# Patient Record
Sex: FEMALE | Race: ASIAN | NOT HISPANIC OR LATINO
[De-identification: names, ages, dates, MRNs, and addresses within clinical notes are randomized per-mention and may not be internally consistent; named-entity substitution may affect disease eponyms.]

---

## 2017-01-04 ENCOUNTER — APPOINTMENT (OUTPATIENT)
Dept: OBGYN | Facility: CLINIC | Age: 32
End: 2017-01-04

## 2017-01-04 VITALS
DIASTOLIC BLOOD PRESSURE: 60 MMHG | HEIGHT: 62 IN | SYSTOLIC BLOOD PRESSURE: 104 MMHG | WEIGHT: 168 LBS | BODY MASS INDEX: 30.91 KG/M2

## 2017-01-06 ENCOUNTER — OUTPATIENT (OUTPATIENT)
Dept: OUTPATIENT SERVICES | Facility: HOSPITAL | Age: 32
LOS: 1 days | End: 2017-01-06
Payer: COMMERCIAL

## 2017-01-06 DIAGNOSIS — O26.90 PREGNANCY RELATED CONDITIONS, UNSPECIFIED, UNSPECIFIED TRIMESTER: ICD-10-CM

## 2017-01-06 LAB
BILIRUB UR QL STRIP: NORMAL
GLUCOSE UR-MCNC: NORMAL
HCG UR QL: 0.2 EU/DL
HGB UR QL STRIP.AUTO: NORMAL
KETONES UR-MCNC: NORMAL
LEUKOCYTE ESTERASE UR QL STRIP: NORMAL
NITRITE UR QL STRIP: NORMAL
PH UR STRIP: 6
PROT UR STRIP-MCNC: NORMAL
SP GR UR STRIP: 1.03

## 2017-01-06 PROCEDURE — 99215 OFFICE O/P EST HI 40 MIN: CPT

## 2017-01-07 ENCOUNTER — OUTPATIENT (OUTPATIENT)
Dept: OUTPATIENT SERVICES | Facility: HOSPITAL | Age: 32
LOS: 1 days | End: 2017-01-07
Payer: COMMERCIAL

## 2017-01-07 DIAGNOSIS — O26.90 PREGNANCY RELATED CONDITIONS, UNSPECIFIED, UNSPECIFIED TRIMESTER: ICD-10-CM

## 2017-01-07 PROCEDURE — 99212 OFFICE O/P EST SF 10 MIN: CPT | Mod: 25

## 2017-01-07 PROCEDURE — 76816 OB US FOLLOW-UP PER FETUS: CPT | Mod: 26

## 2017-01-08 ENCOUNTER — INPATIENT (INPATIENT)
Facility: HOSPITAL | Age: 32
LOS: 1 days | Discharge: HOME | End: 2017-01-10
Attending: OBSTETRICS & GYNECOLOGY | Admitting: OBSTETRICS & GYNECOLOGY
Payer: COMMERCIAL

## 2017-01-08 ENCOUNTER — TRANSCRIPTION ENCOUNTER (OUTPATIENT)
Age: 32
End: 2017-01-08

## 2017-01-08 VITALS — HEART RATE: 91 BPM | SYSTOLIC BLOOD PRESSURE: 107 MMHG | DIASTOLIC BLOOD PRESSURE: 66 MMHG | TEMPERATURE: 98 F

## 2017-01-08 DIAGNOSIS — O26.90 PREGNANCY RELATED CONDITIONS, UNSPECIFIED, UNSPECIFIED TRIMESTER: ICD-10-CM

## 2017-01-08 LAB
BASOPHILS # BLD AUTO: 0.01 K/UL — SIGNIFICANT CHANGE UP (ref 0–0.2)
BASOPHILS NFR BLD AUTO: 0.1 % — SIGNIFICANT CHANGE UP (ref 0–2)
BLD GP AB SCN SERPL QL: NEGATIVE — SIGNIFICANT CHANGE UP
EOSINOPHIL # BLD AUTO: 0.05 K/UL — SIGNIFICANT CHANGE UP (ref 0–0.5)
EOSINOPHIL NFR BLD AUTO: 0.4 % — SIGNIFICANT CHANGE UP (ref 0–6)
HCT VFR BLD CALC: 34.7 % — SIGNIFICANT CHANGE UP (ref 34.5–45)
HGB BLD-MCNC: 10.8 G/DL — LOW (ref 11.5–15.5)
IMM GRANULOCYTES NFR BLD AUTO: 0.7 % — SIGNIFICANT CHANGE UP (ref 0–1.5)
LYMPHOCYTES # BLD AUTO: 1.45 K/UL — SIGNIFICANT CHANGE UP (ref 1–3.3)
LYMPHOCYTES # BLD AUTO: 10.6 % — LOW (ref 13–44)
MCHC RBC-ENTMCNC: 24.3 PG — LOW (ref 27–34)
MCHC RBC-ENTMCNC: 31.1 % — LOW (ref 32–36)
MCV RBC AUTO: 78 FL — LOW (ref 80–100)
MONOCYTES # BLD AUTO: 0.89 K/UL — SIGNIFICANT CHANGE UP (ref 0–0.9)
MONOCYTES NFR BLD AUTO: 6.5 % — SIGNIFICANT CHANGE UP (ref 2–14)
NEUTROPHILS # BLD AUTO: 11.17 K/UL — HIGH (ref 1.8–7.4)
NEUTROPHILS NFR BLD AUTO: 81.7 % — HIGH (ref 43–77)
PLATELET # BLD AUTO: 330 K/UL — SIGNIFICANT CHANGE UP (ref 150–400)
PMV BLD: 11.2 FL — SIGNIFICANT CHANGE UP (ref 7–13)
RBC # BLD: 4.45 M/UL — SIGNIFICANT CHANGE UP (ref 3.8–5.2)
RBC # FLD: 16.2 % — HIGH (ref 10.3–14.5)
RH IG SCN BLD-IMP: POSITIVE — SIGNIFICANT CHANGE UP
WBC # BLD: 13.67 K/UL — HIGH (ref 3.8–10.5)
WBC # FLD AUTO: 13.67 K/UL — HIGH (ref 3.8–10.5)

## 2017-01-08 PROCEDURE — 59400 OBSTETRICAL CARE: CPT | Mod: U8

## 2017-01-08 RX ORDER — LANOLIN
1 OINTMENT (GRAM) TOPICAL EVERY 6 HOURS
Qty: 0 | Refills: 0 | Status: DISCONTINUED | OUTPATIENT
Start: 2017-01-08 | End: 2017-01-10

## 2017-01-08 RX ORDER — OXYTOCIN 10 UNIT/ML
2 VIAL (ML) INJECTION
Qty: 30 | Refills: 0 | Status: DISCONTINUED | OUTPATIENT
Start: 2017-01-08 | End: 2017-01-08

## 2017-01-08 RX ORDER — HYDROCORTISONE 1 %
1 OINTMENT (GRAM) TOPICAL EVERY 4 HOURS
Qty: 0 | Refills: 0 | Status: DISCONTINUED | OUTPATIENT
Start: 2017-01-08 | End: 2017-01-08

## 2017-01-08 RX ORDER — OXYTOCIN 10 UNIT/ML
333.33 VIAL (ML) INJECTION
Qty: 20 | Refills: 0 | Status: COMPLETED | OUTPATIENT
Start: 2017-01-08

## 2017-01-08 RX ORDER — SODIUM CHLORIDE 9 MG/ML
3 INJECTION INTRAMUSCULAR; INTRAVENOUS; SUBCUTANEOUS EVERY 8 HOURS
Qty: 0 | Refills: 0 | Status: DISCONTINUED | OUTPATIENT
Start: 2017-01-08 | End: 2017-01-08

## 2017-01-08 RX ORDER — GLYCERIN ADULT
1 SUPPOSITORY, RECTAL RECTAL AT BEDTIME
Qty: 0 | Refills: 0 | Status: DISCONTINUED | OUTPATIENT
Start: 2017-01-08 | End: 2017-01-10

## 2017-01-08 RX ORDER — AER TRAVELER 0.5 G/1
1 SOLUTION RECTAL; TOPICAL EVERY 4 HOURS
Qty: 0 | Refills: 0 | Status: DISCONTINUED | OUTPATIENT
Start: 2017-01-08 | End: 2017-01-10

## 2017-01-08 RX ORDER — OXYCODONE HYDROCHLORIDE 5 MG/1
5 TABLET ORAL EVERY 4 HOURS
Qty: 0 | Refills: 0 | Status: DISCONTINUED | OUTPATIENT
Start: 2017-01-08 | End: 2017-01-10

## 2017-01-08 RX ORDER — AER TRAVELER 0.5 G/1
1 SOLUTION RECTAL; TOPICAL EVERY 4 HOURS
Qty: 0 | Refills: 0 | Status: DISCONTINUED | OUTPATIENT
Start: 2017-01-08 | End: 2017-01-08

## 2017-01-08 RX ORDER — DOCUSATE SODIUM 100 MG
100 CAPSULE ORAL
Qty: 0 | Refills: 0 | Status: DISCONTINUED | OUTPATIENT
Start: 2017-01-08 | End: 2017-01-10

## 2017-01-08 RX ORDER — OXYTOCIN 10 UNIT/ML
333.33 VIAL (ML) INJECTION
Qty: 20 | Refills: 0 | Status: COMPLETED | OUTPATIENT
Start: 2017-01-08 | End: 2017-01-08

## 2017-01-08 RX ORDER — SODIUM CHLORIDE 9 MG/ML
3 INJECTION INTRAMUSCULAR; INTRAVENOUS; SUBCUTANEOUS EVERY 8 HOURS
Qty: 0 | Refills: 0 | Status: DISCONTINUED | OUTPATIENT
Start: 2017-01-08 | End: 2017-01-09

## 2017-01-08 RX ORDER — HYDROCORTISONE 1 %
1 OINTMENT (GRAM) TOPICAL EVERY 4 HOURS
Qty: 0 | Refills: 0 | Status: DISCONTINUED | OUTPATIENT
Start: 2017-01-08 | End: 2017-01-10

## 2017-01-08 RX ORDER — CITRIC ACID/SODIUM CITRATE 300-500 MG
15 SOLUTION, ORAL ORAL EVERY 4 HOURS
Qty: 0 | Refills: 0 | Status: DISCONTINUED | OUTPATIENT
Start: 2017-01-08 | End: 2017-01-08

## 2017-01-08 RX ORDER — OXYTOCIN 10 UNIT/ML
41.67 VIAL (ML) INJECTION
Qty: 20 | Refills: 0 | Status: DISCONTINUED | OUTPATIENT
Start: 2017-01-08 | End: 2017-01-09

## 2017-01-08 RX ORDER — DIBUCAINE 1 %
1 OINTMENT (GRAM) RECTAL EVERY 4 HOURS
Qty: 0 | Refills: 0 | Status: DISCONTINUED | OUTPATIENT
Start: 2017-01-08 | End: 2017-01-08

## 2017-01-08 RX ORDER — KETOROLAC TROMETHAMINE 30 MG/ML
30 SYRINGE (ML) INJECTION ONCE
Qty: 0 | Refills: 0 | Status: DISCONTINUED | OUTPATIENT
Start: 2017-01-08 | End: 2017-01-08

## 2017-01-08 RX ORDER — PRAMOXINE HYDROCHLORIDE 150 MG/15G
1 AEROSOL, FOAM RECTAL EVERY 4 HOURS
Qty: 0 | Refills: 0 | Status: DISCONTINUED | OUTPATIENT
Start: 2017-01-08 | End: 2017-01-08

## 2017-01-08 RX ORDER — SODIUM CHLORIDE 9 MG/ML
1000 INJECTION, SOLUTION INTRAVENOUS ONCE
Qty: 0 | Refills: 0 | Status: COMPLETED | OUTPATIENT
Start: 2017-01-08 | End: 2017-01-08

## 2017-01-08 RX ORDER — ACETAMINOPHEN 500 MG
975 TABLET ORAL EVERY 6 HOURS
Qty: 0 | Refills: 0 | Status: DISCONTINUED | OUTPATIENT
Start: 2017-01-08 | End: 2017-01-10

## 2017-01-08 RX ORDER — PRAMOXINE HYDROCHLORIDE 150 MG/15G
1 AEROSOL, FOAM RECTAL EVERY 4 HOURS
Qty: 0 | Refills: 0 | Status: DISCONTINUED | OUTPATIENT
Start: 2017-01-08 | End: 2017-01-10

## 2017-01-08 RX ORDER — OXYTOCIN 10 UNIT/ML
41.67 VIAL (ML) INJECTION
Qty: 20 | Refills: 0 | Status: DISCONTINUED | OUTPATIENT
Start: 2017-01-08 | End: 2017-01-08

## 2017-01-08 RX ORDER — TETANUS TOXOID, REDUCED DIPHTHERIA TOXOID AND ACELLULAR PERTUSSIS VACCINE, ADSORBED 5; 2.5; 8; 8; 2.5 [IU]/.5ML; [IU]/.5ML; UG/.5ML; UG/.5ML; UG/.5ML
0.5 SUSPENSION INTRAMUSCULAR ONCE
Qty: 0 | Refills: 0 | Status: DISCONTINUED | OUTPATIENT
Start: 2017-01-08 | End: 2017-01-10

## 2017-01-08 RX ORDER — DIPHENHYDRAMINE HCL 50 MG
25 CAPSULE ORAL EVERY 6 HOURS
Qty: 0 | Refills: 0 | Status: DISCONTINUED | OUTPATIENT
Start: 2017-01-08 | End: 2017-01-10

## 2017-01-08 RX ORDER — MAGNESIUM HYDROXIDE 400 MG/1
30 TABLET, CHEWABLE ORAL
Qty: 0 | Refills: 0 | Status: DISCONTINUED | OUTPATIENT
Start: 2017-01-08 | End: 2017-01-10

## 2017-01-08 RX ORDER — OXYCODONE HYDROCHLORIDE 5 MG/1
5 TABLET ORAL
Qty: 0 | Refills: 0 | Status: DISCONTINUED | OUTPATIENT
Start: 2017-01-08 | End: 2017-01-10

## 2017-01-08 RX ORDER — DIBUCAINE 1 %
1 OINTMENT (GRAM) RECTAL EVERY 4 HOURS
Qty: 0 | Refills: 0 | Status: DISCONTINUED | OUTPATIENT
Start: 2017-01-08 | End: 2017-01-10

## 2017-01-08 RX ORDER — SIMETHICONE 80 MG/1
80 TABLET, CHEWABLE ORAL EVERY 6 HOURS
Qty: 0 | Refills: 0 | Status: DISCONTINUED | OUTPATIENT
Start: 2017-01-08 | End: 2017-01-10

## 2017-01-08 RX ORDER — IBUPROFEN 200 MG
600 TABLET ORAL EVERY 6 HOURS
Qty: 0 | Refills: 0 | Status: DISCONTINUED | OUTPATIENT
Start: 2017-01-08 | End: 2017-01-09

## 2017-01-08 RX ORDER — SODIUM CHLORIDE 9 MG/ML
1000 INJECTION, SOLUTION INTRAVENOUS
Qty: 0 | Refills: 0 | Status: DISCONTINUED | OUTPATIENT
Start: 2017-01-08 | End: 2017-01-08

## 2017-01-08 RX ADMIN — Medication 975 MILLIGRAM(S): at 20:40

## 2017-01-08 RX ADMIN — Medication 975 MILLIGRAM(S): at 21:15

## 2017-01-08 RX ADMIN — SODIUM CHLORIDE 125 MILLILITER(S): 9 INJECTION, SOLUTION INTRAVENOUS at 13:01

## 2017-01-08 RX ADMIN — Medication 125 MILLIUNIT(S)/MIN: at 17:01

## 2017-01-08 RX ADMIN — Medication 30 MILLIGRAM(S): at 18:30

## 2017-01-08 RX ADMIN — SODIUM CHLORIDE 2000 MILLILITER(S): 9 INJECTION, SOLUTION INTRAVENOUS at 11:45

## 2017-01-08 RX ADMIN — Medication 30 MILLIGRAM(S): at 19:02

## 2017-01-08 RX ADMIN — Medication 1000 MILLIUNIT(S)/MIN: at 16:27

## 2017-01-08 RX ADMIN — SODIUM CHLORIDE 3 MILLILITER(S): 9 INJECTION INTRAMUSCULAR; INTRAVENOUS; SUBCUTANEOUS at 22:41

## 2017-01-08 NOTE — DISCHARGE NOTE OB - CARE PLAN
Principal Discharge DX:	Spontaneous vaginal delivery  Goal:	recovery  Instructions for follow-up, activity and diet:	pelvic rest x 6 weeks  regular diet  follow up in 6 weeks

## 2017-01-08 NOTE — DISCHARGE NOTE OB - CARE PROVIDERS DIRECT ADDRESSES
[email or web address]

## 2017-01-08 NOTE — DISCHARGE NOTE OB - CARE PROVIDER_API CALL
Macarena Agrawal), Obstetrics and Gynecology  [address]  [place] [address]  Phone: [phone]  Fax: [phone]

## 2017-01-08 NOTE — DISCHARGE NOTE OB - PATIENT PORTAL LINK FT
“You can access the FollowHealth Patient Portal, offered by [facility], by registering with the following website: [email or web address]”

## 2017-01-08 NOTE — DISCHARGE NOTE OB - MATERIALS PROVIDED
Breastfeeding Mother’s Support Group Information/Guide to Postpartum Care/Shaken Baby Prevention Handout/  Immunization Record/Breastfeeding Guide and Packet/Vaccinations/[facility] Hearing Screen Program/Breastfeeding Log/Bottle Feeding Log/Birth Certificate Instructions/[facility] [place] Screening Program/Back To Sleep Handout

## 2017-01-09 LAB — T PALLIDUM AB TITR SER: NEGATIVE — SIGNIFICANT CHANGE UP

## 2017-01-09 RX ORDER — IBUPROFEN 200 MG
400 TABLET ORAL EVERY 4 HOURS
Qty: 0 | Refills: 0 | Status: DISCONTINUED | OUTPATIENT
Start: 2017-01-09 | End: 2017-01-10

## 2017-01-09 RX ADMIN — Medication 600 MILLIGRAM(S): at 11:00

## 2017-01-09 RX ADMIN — Medication 1 APPLICATION(S): at 01:35

## 2017-01-09 RX ADMIN — SODIUM CHLORIDE 3 MILLILITER(S): 9 INJECTION INTRAMUSCULAR; INTRAVENOUS; SUBCUTANEOUS at 07:15

## 2017-01-09 RX ADMIN — Medication 600 MILLIGRAM(S): at 01:37

## 2017-01-09 RX ADMIN — Medication 100 MILLIGRAM(S): at 05:57

## 2017-01-09 RX ADMIN — Medication 600 MILLIGRAM(S): at 02:10

## 2017-01-09 RX ADMIN — PRAMOXINE HYDROCHLORIDE 1 APPLICATION(S): 150 AEROSOL, FOAM RECTAL at 01:36

## 2017-01-09 RX ADMIN — Medication 975 MILLIGRAM(S): at 05:58

## 2017-01-09 RX ADMIN — Medication 1 TABLET(S): at 10:14

## 2017-01-09 RX ADMIN — Medication 975 MILLIGRAM(S): at 14:18

## 2017-01-09 RX ADMIN — Medication 975 MILLIGRAM(S): at 06:40

## 2017-01-09 RX ADMIN — Medication 400 MILLIGRAM(S): at 23:38

## 2017-01-09 RX ADMIN — Medication 400 MILLIGRAM(S): at 18:30

## 2017-01-09 RX ADMIN — Medication 400 MILLIGRAM(S): at 17:30

## 2017-01-09 RX ADMIN — Medication 975 MILLIGRAM(S): at 15:03

## 2017-01-09 RX ADMIN — Medication 100 MILLIGRAM(S): at 17:29

## 2017-01-09 RX ADMIN — Medication 975 MILLIGRAM(S): at 23:39

## 2017-01-09 RX ADMIN — Medication 600 MILLIGRAM(S): at 10:14

## 2017-01-09 NOTE — LACTATION INITIAL EVALUATION - INTERVENTION OUTCOME
needs met/demonstrates understanding of teaching/nbn demonstrated  deep latch and  performed  with sucking and swallowing  noted   ,  pt instructed to notify pediatrician if nbn not feeding 8-12 times/24 hours and not voiding and stooling according to breastfeeding log./verbalizes understanding

## 2017-01-09 NOTE — LACTATION INITIAL EVALUATION - LACTATION INTERVENTIONS
initiate skin to skin/assisted with deep latch and positioning  .  discussed  signs  of  effective  feeding and  swallowing.   discussed  compression at  breast when  nbn  stops  drinking  and  is  still sucking.

## 2017-01-10 VITALS
SYSTOLIC BLOOD PRESSURE: 104 MMHG | HEART RATE: 73 BPM | TEMPERATURE: 98 F | RESPIRATION RATE: 18 BRPM | DIASTOLIC BLOOD PRESSURE: 65 MMHG | OXYGEN SATURATION: 100 %

## 2017-01-10 RX ADMIN — Medication 400 MILLIGRAM(S): at 09:18

## 2017-01-10 RX ADMIN — Medication 975 MILLIGRAM(S): at 00:30

## 2017-01-10 RX ADMIN — Medication 400 MILLIGRAM(S): at 00:30

## 2017-01-10 RX ADMIN — Medication 400 MILLIGRAM(S): at 05:02

## 2017-01-10 RX ADMIN — Medication 400 MILLIGRAM(S): at 06:00

## 2017-01-11 ENCOUNTER — APPOINTMENT (OUTPATIENT)
Dept: OBGYN | Facility: CLINIC | Age: 32
End: 2017-01-11

## 2017-01-18 ENCOUNTER — OTHER (OUTPATIENT)
Age: 32
End: 2017-01-18

## 2017-01-18 ENCOUNTER — APPOINTMENT (OUTPATIENT)
Dept: OBGYN | Facility: CLINIC | Age: 32
End: 2017-01-18

## 2017-01-18 DIAGNOSIS — O12.00 GESTATIONAL EDEMA, UNSPECIFIED TRIMESTER: ICD-10-CM

## 2017-01-25 ENCOUNTER — APPOINTMENT (OUTPATIENT)
Dept: OBGYN | Facility: CLINIC | Age: 32
End: 2017-01-25

## 2017-03-01 ENCOUNTER — APPOINTMENT (OUTPATIENT)
Dept: OBGYN | Facility: CLINIC | Age: 32
End: 2017-03-01

## 2017-03-01 VITALS
WEIGHT: 138 LBS | HEIGHT: 62 IN | DIASTOLIC BLOOD PRESSURE: 60 MMHG | SYSTOLIC BLOOD PRESSURE: 92 MMHG | BODY MASS INDEX: 25.4 KG/M2

## 2017-05-16 ENCOUNTER — MEDICATION RENEWAL (OUTPATIENT)
Age: 32
End: 2017-05-16

## 2017-05-19 ENCOUNTER — MEDICATION RENEWAL (OUTPATIENT)
Age: 32
End: 2017-05-19

## 2017-05-19 LAB
HCG SERPL QL: POSITIVE
PAPP-A SERPL-ACNC: 170 MIU/ML
PROGEST SERPL-MCNC: 14.8 NG/ML

## 2017-05-31 ENCOUNTER — OTHER (OUTPATIENT)
Age: 32
End: 2017-05-31

## 2017-06-07 ENCOUNTER — APPOINTMENT (OUTPATIENT)
Dept: OBGYN | Facility: CLINIC | Age: 32
End: 2017-06-07

## 2017-06-07 ENCOUNTER — OTHER (OUTPATIENT)
Age: 32
End: 2017-06-07

## 2017-06-07 VITALS
WEIGHT: 140 LBS | HEIGHT: 62 IN | SYSTOLIC BLOOD PRESSURE: 110 MMHG | BODY MASS INDEX: 25.76 KG/M2 | DIASTOLIC BLOOD PRESSURE: 70 MMHG

## 2017-06-07 DIAGNOSIS — Z3A.24 24 WEEKS GESTATION OF PREGNANCY: ICD-10-CM

## 2017-06-07 DIAGNOSIS — Z3A.30 30 WEEKS GESTATION OF PREGNANCY: ICD-10-CM

## 2017-06-07 DIAGNOSIS — Z3A.20 20 WEEKS GESTATION OF PREGNANCY: ICD-10-CM

## 2017-06-07 DIAGNOSIS — Z3A.11 11 WEEKS GESTATION OF PREGNANCY: ICD-10-CM

## 2017-06-07 DIAGNOSIS — Z3A.28 28 WEEKS GESTATION OF PREGNANCY: ICD-10-CM

## 2017-06-07 DIAGNOSIS — Z3A.32 32 WEEKS GESTATION OF PREGNANCY: ICD-10-CM

## 2017-06-07 DIAGNOSIS — Z3A.34 34 WEEKS GESTATION OF PREGNANCY: ICD-10-CM

## 2017-06-09 LAB
C TRACH RRNA SPEC QL NAA+PROBE: NORMAL
N GONORRHOEA RRNA SPEC QL NAA+PROBE: NORMAL
SOURCE AMPLIFICATION: NORMAL

## 2017-06-14 ENCOUNTER — APPOINTMENT (OUTPATIENT)
Dept: OBGYN | Facility: CLINIC | Age: 32
End: 2017-06-14

## 2017-06-14 VITALS
WEIGHT: 138 LBS | BODY MASS INDEX: 25.4 KG/M2 | SYSTOLIC BLOOD PRESSURE: 110 MMHG | DIASTOLIC BLOOD PRESSURE: 70 MMHG | HEIGHT: 62 IN

## 2017-06-21 ENCOUNTER — APPOINTMENT (OUTPATIENT)
Dept: OBGYN | Facility: CLINIC | Age: 32
End: 2017-06-21

## 2017-06-21 VITALS
BODY MASS INDEX: 25.21 KG/M2 | WEIGHT: 137 LBS | SYSTOLIC BLOOD PRESSURE: 110 MMHG | HEIGHT: 62 IN | DIASTOLIC BLOOD PRESSURE: 70 MMHG

## 2017-06-21 LAB
BILIRUB UR QL STRIP: NORMAL
GLUCOSE UR-MCNC: NORMAL
HCG UR QL: 0.2 EU/DL
HGB UR QL STRIP.AUTO: NORMAL
KETONES UR-MCNC: NORMAL
LEUKOCYTE ESTERASE UR QL STRIP: NORMAL
NITRITE UR QL STRIP: NORMAL
PH UR STRIP: 6
PROT UR STRIP-MCNC: NORMAL
SP GR UR STRIP: 1.02

## 2017-06-27 ENCOUNTER — APPOINTMENT (OUTPATIENT)
Dept: OBGYN | Facility: CLINIC | Age: 32
End: 2017-06-27

## 2017-06-27 VITALS
DIASTOLIC BLOOD PRESSURE: 70 MMHG | WEIGHT: 139 LBS | BODY MASS INDEX: 25.58 KG/M2 | SYSTOLIC BLOOD PRESSURE: 110 MMHG | HEIGHT: 62 IN

## 2017-06-29 ENCOUNTER — LABORATORY RESULT (OUTPATIENT)
Age: 32
End: 2017-06-29

## 2017-06-30 LAB
ABO + RH PNL BLD: NORMAL
ALBUMIN SERPL ELPH-MCNC: 3.9 G/DL
ALP BLD-CCNC: 65 U/L
ALT SERPL-CCNC: 11 U/L
ANION GAP SERPL CALC-SCNC: 15 MMOL/L
AST SERPL-CCNC: 18 U/L
BASOPHILS # BLD AUTO: 0.01 K/UL
BASOPHILS NFR BLD AUTO: 0.1 %
BILIRUB SERPL-MCNC: 0.3 MG/DL
BLD GP AB SCN SERPL QL: NORMAL
BUN SERPL-MCNC: 8 MG/DL
CALCIUM SERPL-MCNC: 10.3 MG/DL
CHLORIDE SERPL-SCNC: 101 MMOL/L
CO2 SERPL-SCNC: 21 MMOL/L
CREAT SERPL-MCNC: 0.61 MG/DL
EOSINOPHIL # BLD AUTO: 0.1 K/UL
EOSINOPHIL NFR BLD AUTO: 1.1 %
GLUCOSE SERPL-MCNC: 81 MG/DL
HBV SURFACE AG SER QL: NONREACTIVE
HCT VFR BLD CALC: 36.9 %
HGB BLD-MCNC: 12 G/DL
HIV1+2 AB SPEC QL IA.RAPID: NONREACTIVE
IMM GRANULOCYTES NFR BLD AUTO: 0.2 %
LYMPHOCYTES # BLD AUTO: 2.04 K/UL
LYMPHOCYTES NFR BLD AUTO: 22.1 %
MAN DIFF?: NORMAL
MCHC RBC-ENTMCNC: 26.4 PG
MCHC RBC-ENTMCNC: 32.5 GM/DL
MCV RBC AUTO: 81.3 FL
MONOCYTES # BLD AUTO: 0.75 K/UL
MONOCYTES NFR BLD AUTO: 8.1 %
NEUTROPHILS # BLD AUTO: 6.31 K/UL
NEUTROPHILS NFR BLD AUTO: 68.4 %
PLATELET # BLD AUTO: 384 K/UL
POTASSIUM SERPL-SCNC: 4.3 MMOL/L
PROT SERPL-MCNC: 8 G/DL
RBC # BLD: 4.54 M/UL
RBC # FLD: 16.1 %
RUBV IGG FLD-ACNC: 1.8 INDEX
RUBV IGG SER-IMP: POSITIVE
SODIUM SERPL-SCNC: 137 MMOL/L
T GONDII AB SER-IMP: NEGATIVE
T GONDII AB SER-IMP: NEGATIVE
T GONDII IGG SER QL: <3 IU/ML
T GONDII IGM SER QL: <3 AU/ML
T PALLIDUM AB SER QL IA: NEGATIVE
TSH SERPL-ACNC: 0.03 UIU/ML
VZV AB TITR SER: POSITIVE
VZV IGG SER IF-ACNC: 627.7 INDEX
WBC # FLD AUTO: 9.23 K/UL

## 2017-07-05 ENCOUNTER — APPOINTMENT (OUTPATIENT)
Dept: OBGYN | Facility: CLINIC | Age: 32
End: 2017-07-05

## 2017-07-05 VITALS
DIASTOLIC BLOOD PRESSURE: 70 MMHG | WEIGHT: 139 LBS | HEIGHT: 62 IN | SYSTOLIC BLOOD PRESSURE: 110 MMHG | BODY MASS INDEX: 25.58 KG/M2

## 2017-07-14 ENCOUNTER — APPOINTMENT (OUTPATIENT)
Dept: ANTEPARTUM | Facility: CLINIC | Age: 32
End: 2017-07-14

## 2017-07-14 ENCOUNTER — ASOB RESULT (OUTPATIENT)
Age: 32
End: 2017-07-14

## 2017-07-14 LAB
CFTR MUT TESTED BLD/T: NORMAL
HGB A MFR BLD: 96.9 %
HGB A2 MFR BLD: 1.8 %
HGB F MFR BLD: 1.3 %
HGB FRACT BLD-IMP: NORMAL

## 2017-07-18 ENCOUNTER — LABORATORY RESULT (OUTPATIENT)
Age: 32
End: 2017-07-18

## 2017-07-25 ENCOUNTER — APPOINTMENT (OUTPATIENT)
Dept: OBGYN | Facility: CLINIC | Age: 32
End: 2017-07-25

## 2017-07-25 VITALS
HEIGHT: 62 IN | BODY MASS INDEX: 25.58 KG/M2 | SYSTOLIC BLOOD PRESSURE: 108 MMHG | WEIGHT: 139 LBS | DIASTOLIC BLOOD PRESSURE: 70 MMHG

## 2017-07-25 LAB
BILIRUB UR QL STRIP: NORMAL
GLUCOSE UR-MCNC: NORMAL
HCG UR QL: 0.2 EU/DL
HGB UR QL STRIP.AUTO: NORMAL
KETONES UR-MCNC: NORMAL
LEUKOCYTE ESTERASE UR QL STRIP: NORMAL
NITRITE UR QL STRIP: NORMAL
PH UR STRIP: 5.5
PROT UR STRIP-MCNC: NORMAL
SP GR UR STRIP: 1.03

## 2017-08-14 ENCOUNTER — LABORATORY RESULT (OUTPATIENT)
Age: 32
End: 2017-08-14

## 2017-08-15 ENCOUNTER — APPOINTMENT (OUTPATIENT)
Dept: OBGYN | Facility: CLINIC | Age: 32
End: 2017-08-15
Payer: COMMERCIAL

## 2017-08-15 VITALS
HEIGHT: 62 IN | SYSTOLIC BLOOD PRESSURE: 108 MMHG | DIASTOLIC BLOOD PRESSURE: 70 MMHG | WEIGHT: 142 LBS | BODY MASS INDEX: 26.13 KG/M2

## 2017-08-15 PROCEDURE — 0502F SUBSEQUENT PRENATAL CARE: CPT

## 2017-09-11 ENCOUNTER — OUTPATIENT (OUTPATIENT)
Dept: OUTPATIENT SERVICES | Facility: HOSPITAL | Age: 32
LOS: 1 days | End: 2017-09-11

## 2017-09-11 DIAGNOSIS — O26.899 OTHER SPECIFIED PREGNANCY RELATED CONDITIONS, UNSPECIFIED TRIMESTER: ICD-10-CM

## 2017-09-11 DIAGNOSIS — Z3A.00 WEEKS OF GESTATION OF PREGNANCY NOT SPECIFIED: ICD-10-CM

## 2017-09-11 LAB
APPEARANCE UR: SIGNIFICANT CHANGE UP
BACTERIA # UR AUTO: SIGNIFICANT CHANGE UP
BILIRUB UR-MCNC: NEGATIVE — SIGNIFICANT CHANGE UP
BLOOD UR QL VISUAL: NEGATIVE — SIGNIFICANT CHANGE UP
COLOR SPEC: YELLOW — SIGNIFICANT CHANGE UP
GLUCOSE UR-MCNC: NEGATIVE — SIGNIFICANT CHANGE UP
KETONES UR-MCNC: NEGATIVE — SIGNIFICANT CHANGE UP
LEUKOCYTE ESTERASE UR-ACNC: HIGH
MUCOUS THREADS # UR AUTO: SIGNIFICANT CHANGE UP
NITRITE UR-MCNC: NEGATIVE — SIGNIFICANT CHANGE UP
NON-SQ EPI CELLS # UR AUTO: <1 — SIGNIFICANT CHANGE UP
PH UR: 6.5 — SIGNIFICANT CHANGE UP (ref 4.6–8)
PROT UR-MCNC: 100 — HIGH
RBC CASTS # UR COMP ASSIST: HIGH (ref 0–?)
SP GR SPEC: 1.03 — HIGH (ref 1–1.03)
SQUAMOUS # UR AUTO: SIGNIFICANT CHANGE UP
UROBILINOGEN FLD QL: NORMAL E.U. — SIGNIFICANT CHANGE UP (ref 0.1–0.2)
WBC UR QL: SIGNIFICANT CHANGE UP (ref 0–?)

## 2017-09-14 ENCOUNTER — ASOB RESULT (OUTPATIENT)
Age: 32
End: 2017-09-14

## 2017-09-14 ENCOUNTER — APPOINTMENT (OUTPATIENT)
Dept: ANTEPARTUM | Facility: CLINIC | Age: 32
End: 2017-09-14
Payer: COMMERCIAL

## 2017-09-14 PROCEDURE — 76811 OB US DETAILED SNGL FETUS: CPT

## 2017-09-19 ENCOUNTER — APPOINTMENT (OUTPATIENT)
Dept: OBGYN | Facility: CLINIC | Age: 32
End: 2017-09-19

## 2017-09-26 ENCOUNTER — APPOINTMENT (OUTPATIENT)
Dept: OBGYN | Facility: CLINIC | Age: 32
End: 2017-09-26
Payer: COMMERCIAL

## 2017-09-26 VITALS
HEIGHT: 62 IN | SYSTOLIC BLOOD PRESSURE: 110 MMHG | BODY MASS INDEX: 27.97 KG/M2 | WEIGHT: 152 LBS | DIASTOLIC BLOOD PRESSURE: 70 MMHG

## 2017-09-26 PROCEDURE — 0502F SUBSEQUENT PRENATAL CARE: CPT

## 2017-09-28 ENCOUNTER — OTHER (OUTPATIENT)
Age: 32
End: 2017-09-28

## 2017-10-10 ENCOUNTER — APPOINTMENT (OUTPATIENT)
Dept: OBGYN | Facility: CLINIC | Age: 32
End: 2017-10-10

## 2017-10-30 ENCOUNTER — APPOINTMENT (OUTPATIENT)
Dept: OBGYN | Facility: CLINIC | Age: 32
End: 2017-10-30
Payer: COMMERCIAL

## 2017-10-30 VITALS
WEIGHT: 159 LBS | BODY MASS INDEX: 29.26 KG/M2 | DIASTOLIC BLOOD PRESSURE: 60 MMHG | SYSTOLIC BLOOD PRESSURE: 100 MMHG | HEIGHT: 62 IN

## 2017-10-30 PROCEDURE — 0502F SUBSEQUENT PRENATAL CARE: CPT

## 2017-10-31 LAB
CANDIDA VAG CYTO: NOT DETECTED
G VAGINALIS+PREV SP MTYP VAG QL MICRO: NOT DETECTED
T VAGINALIS VAG QL WET PREP: NOT DETECTED

## 2017-11-07 ENCOUNTER — APPOINTMENT (OUTPATIENT)
Dept: OBGYN | Facility: CLINIC | Age: 32
End: 2017-11-07

## 2017-11-11 ENCOUNTER — LABORATORY RESULT (OUTPATIENT)
Age: 32
End: 2017-11-11

## 2017-11-13 ENCOUNTER — APPOINTMENT (OUTPATIENT)
Dept: OBGYN | Facility: CLINIC | Age: 32
End: 2017-11-13
Payer: COMMERCIAL

## 2017-11-13 VITALS
DIASTOLIC BLOOD PRESSURE: 64 MMHG | HEIGHT: 62 IN | WEIGHT: 162 LBS | SYSTOLIC BLOOD PRESSURE: 110 MMHG | BODY MASS INDEX: 29.81 KG/M2

## 2017-11-13 PROCEDURE — 0502F SUBSEQUENT PRENATAL CARE: CPT

## 2017-11-21 ENCOUNTER — APPOINTMENT (OUTPATIENT)
Dept: OBGYN | Facility: CLINIC | Age: 32
End: 2017-11-21

## 2017-11-27 ENCOUNTER — APPOINTMENT (OUTPATIENT)
Dept: OBGYN | Facility: CLINIC | Age: 32
End: 2017-11-27
Payer: COMMERCIAL

## 2017-11-27 VITALS
DIASTOLIC BLOOD PRESSURE: 70 MMHG | SYSTOLIC BLOOD PRESSURE: 110 MMHG | WEIGHT: 167 LBS | HEIGHT: 62 IN | BODY MASS INDEX: 30.73 KG/M2

## 2017-11-27 PROCEDURE — 0502F SUBSEQUENT PRENATAL CARE: CPT

## 2017-12-01 ENCOUNTER — OUTPATIENT (OUTPATIENT)
Dept: OUTPATIENT SERVICES | Facility: HOSPITAL | Age: 32
LOS: 1 days | End: 2017-12-01
Payer: COMMERCIAL

## 2017-12-01 DIAGNOSIS — O26.899 OTHER SPECIFIED PREGNANCY RELATED CONDITIONS, UNSPECIFIED TRIMESTER: ICD-10-CM

## 2017-12-01 LAB
APPEARANCE UR: CLEAR — SIGNIFICANT CHANGE UP
BILIRUB UR-MCNC: NEGATIVE — SIGNIFICANT CHANGE UP
BLOOD UR QL VISUAL: NEGATIVE — SIGNIFICANT CHANGE UP
COLOR SPEC: YELLOW — SIGNIFICANT CHANGE UP
GLUCOSE UR-MCNC: SIGNIFICANT CHANGE UP
KETONES UR-MCNC: SIGNIFICANT CHANGE UP
LEUKOCYTE ESTERASE UR-ACNC: NEGATIVE — SIGNIFICANT CHANGE UP
MUCOUS THREADS # UR AUTO: SIGNIFICANT CHANGE UP
NITRITE UR-MCNC: NEGATIVE — SIGNIFICANT CHANGE UP
NON-SQ EPI CELLS # UR AUTO: <1 — SIGNIFICANT CHANGE UP
PH UR: 6.5 — SIGNIFICANT CHANGE UP (ref 4.6–8)
PROT UR-MCNC: 30 — HIGH
RBC CASTS # UR COMP ASSIST: SIGNIFICANT CHANGE UP (ref 0–?)
SP GR SPEC: 1.03 — HIGH (ref 1–1.03)
SQUAMOUS # UR AUTO: SIGNIFICANT CHANGE UP
UROBILINOGEN FLD QL: 1 E.U. — SIGNIFICANT CHANGE UP (ref 0.1–0.2)
WBC UR QL: SIGNIFICANT CHANGE UP (ref 0–?)

## 2017-12-01 PROCEDURE — 99213 OFFICE O/P EST LOW 20 MIN: CPT | Mod: 25

## 2017-12-01 PROCEDURE — 76815 OB US LIMITED FETUS(S): CPT | Mod: 26

## 2017-12-05 ENCOUNTER — APPOINTMENT (OUTPATIENT)
Dept: OBGYN | Facility: CLINIC | Age: 32
End: 2017-12-05

## 2017-12-08 ENCOUNTER — ASOB RESULT (OUTPATIENT)
Age: 32
End: 2017-12-08

## 2017-12-08 ENCOUNTER — APPOINTMENT (OUTPATIENT)
Dept: ANTEPARTUM | Facility: CLINIC | Age: 32
End: 2017-12-08
Payer: COMMERCIAL

## 2017-12-08 PROCEDURE — 76819 FETAL BIOPHYS PROFIL W/O NST: CPT

## 2017-12-08 PROCEDURE — 76816 OB US FOLLOW-UP PER FETUS: CPT

## 2017-12-11 ENCOUNTER — APPOINTMENT (OUTPATIENT)
Dept: OBGYN | Facility: CLINIC | Age: 32
End: 2017-12-11
Payer: COMMERCIAL

## 2017-12-11 VITALS
HEIGHT: 62 IN | DIASTOLIC BLOOD PRESSURE: 60 MMHG | BODY MASS INDEX: 30.55 KG/M2 | SYSTOLIC BLOOD PRESSURE: 110 MMHG | WEIGHT: 166 LBS

## 2017-12-11 PROCEDURE — 0502F SUBSEQUENT PRENATAL CARE: CPT

## 2017-12-19 ENCOUNTER — APPOINTMENT (OUTPATIENT)
Dept: OBGYN | Facility: CLINIC | Age: 32
End: 2017-12-19

## 2017-12-26 ENCOUNTER — APPOINTMENT (OUTPATIENT)
Dept: OBGYN | Facility: CLINIC | Age: 32
End: 2017-12-26
Payer: COMMERCIAL

## 2017-12-26 VITALS
HEIGHT: 62 IN | DIASTOLIC BLOOD PRESSURE: 60 MMHG | SYSTOLIC BLOOD PRESSURE: 86 MMHG | WEIGHT: 165 LBS | BODY MASS INDEX: 30.36 KG/M2

## 2017-12-26 PROCEDURE — 90715 TDAP VACCINE 7 YRS/> IM: CPT

## 2017-12-26 PROCEDURE — 0502F SUBSEQUENT PRENATAL CARE: CPT

## 2017-12-26 PROCEDURE — 90471 IMMUNIZATION ADMIN: CPT

## 2018-01-02 ENCOUNTER — APPOINTMENT (OUTPATIENT)
Dept: OBGYN | Facility: CLINIC | Age: 33
End: 2018-01-02

## 2018-01-02 LAB
BILIRUB UR QL STRIP: NORMAL
GLUCOSE UR-MCNC: NORMAL
GP B STREP DNA SPEC QL NAA+PROBE: NORMAL
GP B STREP DNA SPEC QL NAA+PROBE: NOT DETECTED
HCG UR QL: 0.2 EU/DL
HGB UR QL STRIP.AUTO: NORMAL
KETONES UR-MCNC: NORMAL
LEUKOCYTE ESTERASE UR QL STRIP: NORMAL
NITRITE UR QL STRIP: NORMAL
PH UR STRIP: 6
PROT UR STRIP-MCNC: NORMAL
SOURCE GBS: NORMAL
SP GR UR STRIP: 1.02

## 2018-01-03 ENCOUNTER — APPOINTMENT (OUTPATIENT)
Dept: OBGYN | Facility: CLINIC | Age: 33
End: 2018-01-03
Payer: COMMERCIAL

## 2018-01-03 VITALS
BODY MASS INDEX: 31.1 KG/M2 | DIASTOLIC BLOOD PRESSURE: 60 MMHG | SYSTOLIC BLOOD PRESSURE: 92 MMHG | WEIGHT: 169 LBS | HEIGHT: 62 IN

## 2018-01-03 DIAGNOSIS — Z3A.36 36 WEEKS GESTATION OF PREGNANCY: ICD-10-CM

## 2018-01-03 PROCEDURE — 0502F SUBSEQUENT PRENATAL CARE: CPT

## 2018-01-05 LAB
BILIRUB UR QL STRIP: NORMAL
GLUCOSE UR-MCNC: NORMAL
HCG UR QL: 0.2 EU/DL
HGB UR QL STRIP.AUTO: NORMAL
KETONES UR-MCNC: NORMAL
LEUKOCYTE ESTERASE UR QL STRIP: NORMAL
NITRITE UR QL STRIP: NORMAL
PH UR STRIP: 6.5
PROT UR STRIP-MCNC: NORMAL
SP GR UR STRIP: 1.02

## 2018-01-09 ENCOUNTER — APPOINTMENT (OUTPATIENT)
Dept: OBGYN | Facility: CLINIC | Age: 33
End: 2018-01-09
Payer: COMMERCIAL

## 2018-01-09 ENCOUNTER — APPOINTMENT (OUTPATIENT)
Dept: OBGYN | Facility: CLINIC | Age: 33
End: 2018-01-09

## 2018-01-09 VITALS
SYSTOLIC BLOOD PRESSURE: 98 MMHG | HEIGHT: 62 IN | WEIGHT: 168 LBS | DIASTOLIC BLOOD PRESSURE: 60 MMHG | BODY MASS INDEX: 30.91 KG/M2

## 2018-01-09 PROCEDURE — 0502F SUBSEQUENT PRENATAL CARE: CPT

## 2018-01-10 ENCOUNTER — INPATIENT (INPATIENT)
Facility: HOSPITAL | Age: 33
LOS: 1 days | Discharge: HOME | End: 2018-01-12
Attending: OBSTETRICS & GYNECOLOGY | Admitting: OBSTETRICS & GYNECOLOGY
Payer: COMMERCIAL

## 2018-01-10 VITALS — WEIGHT: 165.35 LBS | HEIGHT: 62 IN

## 2018-01-10 DIAGNOSIS — Z3A.00 WEEKS OF GESTATION OF PREGNANCY NOT SPECIFIED: ICD-10-CM

## 2018-01-10 DIAGNOSIS — O26.899 OTHER SPECIFIED PREGNANCY RELATED CONDITIONS, UNSPECIFIED TRIMESTER: ICD-10-CM

## 2018-01-10 LAB
BASOPHILS # BLD AUTO: 0.02 K/UL — SIGNIFICANT CHANGE UP (ref 0–0.2)
BASOPHILS NFR BLD AUTO: 0.2 % — SIGNIFICANT CHANGE UP (ref 0–2)
BLD GP AB SCN SERPL QL: NEGATIVE — SIGNIFICANT CHANGE UP
EOSINOPHIL # BLD AUTO: 0.05 K/UL — SIGNIFICANT CHANGE UP (ref 0–0.5)
EOSINOPHIL NFR BLD AUTO: 0.4 % — SIGNIFICANT CHANGE UP (ref 0–6)
HCT VFR BLD CALC: 34.2 % — LOW (ref 34.5–45)
HGB BLD-MCNC: 9.7 G/DL — LOW (ref 11.5–15.5)
IMM GRANULOCYTES # BLD AUTO: 0.12 # — SIGNIFICANT CHANGE UP
IMM GRANULOCYTES NFR BLD AUTO: 0.9 % — SIGNIFICANT CHANGE UP (ref 0–1.5)
LYMPHOCYTES # BLD AUTO: 1.74 K/UL — SIGNIFICANT CHANGE UP (ref 1–3.3)
LYMPHOCYTES # BLD AUTO: 13.8 % — SIGNIFICANT CHANGE UP (ref 13–44)
MCHC RBC-ENTMCNC: 21 PG — LOW (ref 27–34)
MCHC RBC-ENTMCNC: 28.4 % — LOW (ref 32–36)
MCV RBC AUTO: 73.9 FL — LOW (ref 80–100)
MONOCYTES # BLD AUTO: 0.93 K/UL — HIGH (ref 0–0.9)
MONOCYTES NFR BLD AUTO: 7.4 % — SIGNIFICANT CHANGE UP (ref 2–14)
NEUTROPHILS # BLD AUTO: 9.78 K/UL — HIGH (ref 1.8–7.4)
NEUTROPHILS NFR BLD AUTO: 77.3 % — HIGH (ref 43–77)
NRBC # FLD: 0 — SIGNIFICANT CHANGE UP
PLATELET # BLD AUTO: 343 K/UL — SIGNIFICANT CHANGE UP (ref 150–400)
PMV BLD: 10.2 FL — SIGNIFICANT CHANGE UP (ref 7–13)
RBC # BLD: 4.63 M/UL — SIGNIFICANT CHANGE UP (ref 3.8–5.2)
RBC # FLD: 17.6 % — HIGH (ref 10.3–14.5)
RH IG SCN BLD-IMP: POSITIVE — SIGNIFICANT CHANGE UP
WBC # BLD: 12.64 K/UL — HIGH (ref 3.8–10.5)
WBC # FLD AUTO: 12.64 K/UL — HIGH (ref 3.8–10.5)

## 2018-01-10 PROCEDURE — 59400 OBSTETRICAL CARE: CPT | Mod: U8

## 2018-01-10 RX ORDER — MAGNESIUM HYDROXIDE 400 MG/1
30 TABLET, CHEWABLE ORAL
Qty: 0 | Refills: 0 | Status: DISCONTINUED | OUTPATIENT
Start: 2018-01-11 | End: 2018-01-12

## 2018-01-10 RX ORDER — OXYTOCIN 10 UNIT/ML
41.67 VIAL (ML) INJECTION
Qty: 20 | Refills: 0 | Status: DISCONTINUED | OUTPATIENT
Start: 2018-01-10 | End: 2018-01-11

## 2018-01-10 RX ORDER — OXYTOCIN 10 UNIT/ML
333.33 VIAL (ML) INJECTION
Qty: 20 | Refills: 0 | Status: DISCONTINUED | OUTPATIENT
Start: 2018-01-10 | End: 2018-01-11

## 2018-01-10 RX ORDER — GLYCERIN ADULT
1 SUPPOSITORY, RECTAL RECTAL AT BEDTIME
Qty: 0 | Refills: 0 | Status: DISCONTINUED | OUTPATIENT
Start: 2018-01-11 | End: 2018-01-12

## 2018-01-10 RX ORDER — IBUPROFEN 200 MG
600 TABLET ORAL EVERY 6 HOURS
Qty: 0 | Refills: 0 | Status: COMPLETED | OUTPATIENT
Start: 2018-01-10 | End: 2018-12-09

## 2018-01-10 RX ORDER — PRAMOXINE HYDROCHLORIDE 150 MG/15G
1 AEROSOL, FOAM RECTAL EVERY 4 HOURS
Qty: 0 | Refills: 0 | Status: DISCONTINUED | OUTPATIENT
Start: 2018-01-10 | End: 2018-01-10

## 2018-01-10 RX ORDER — TETANUS TOXOID, REDUCED DIPHTHERIA TOXOID AND ACELLULAR PERTUSSIS VACCINE, ADSORBED 5; 2.5; 8; 8; 2.5 [IU]/.5ML; [IU]/.5ML; UG/.5ML; UG/.5ML; UG/.5ML
0.5 SUSPENSION INTRAMUSCULAR ONCE
Qty: 0 | Refills: 0 | Status: DISCONTINUED | OUTPATIENT
Start: 2018-01-11 | End: 2018-01-12

## 2018-01-10 RX ORDER — SIMETHICONE 80 MG/1
80 TABLET, CHEWABLE ORAL EVERY 6 HOURS
Qty: 0 | Refills: 0 | Status: DISCONTINUED | OUTPATIENT
Start: 2018-01-11 | End: 2018-01-12

## 2018-01-10 RX ORDER — SODIUM CHLORIDE 9 MG/ML
3 INJECTION INTRAMUSCULAR; INTRAVENOUS; SUBCUTANEOUS EVERY 8 HOURS
Qty: 0 | Refills: 0 | Status: DISCONTINUED | OUTPATIENT
Start: 2018-01-11 | End: 2018-01-12

## 2018-01-10 RX ORDER — OXYCODONE HYDROCHLORIDE 5 MG/1
5 TABLET ORAL
Qty: 0 | Refills: 0 | Status: DISCONTINUED | OUTPATIENT
Start: 2018-01-10 | End: 2018-01-12

## 2018-01-10 RX ORDER — CITRIC ACID/SODIUM CITRATE 300-500 MG
15 SOLUTION, ORAL ORAL EVERY 4 HOURS
Qty: 0 | Refills: 0 | Status: DISCONTINUED | OUTPATIENT
Start: 2018-01-10 | End: 2018-01-10

## 2018-01-10 RX ORDER — LANOLIN
1 OINTMENT (GRAM) TOPICAL EVERY 6 HOURS
Qty: 0 | Refills: 0 | Status: DISCONTINUED | OUTPATIENT
Start: 2018-01-11 | End: 2018-01-12

## 2018-01-10 RX ORDER — DIBUCAINE 1 %
1 OINTMENT (GRAM) RECTAL EVERY 4 HOURS
Qty: 0 | Refills: 0 | Status: DISCONTINUED | OUTPATIENT
Start: 2018-01-10 | End: 2018-01-10

## 2018-01-10 RX ORDER — SODIUM CHLORIDE 9 MG/ML
1000 INJECTION, SOLUTION INTRAVENOUS
Qty: 0 | Refills: 0 | Status: DISCONTINUED | OUTPATIENT
Start: 2018-01-10 | End: 2018-01-10

## 2018-01-10 RX ORDER — OXYTOCIN 10 UNIT/ML
333.33 VIAL (ML) INJECTION
Qty: 20 | Refills: 0 | Status: COMPLETED | OUTPATIENT
Start: 2018-01-10

## 2018-01-10 RX ORDER — OXYTOCIN 10 UNIT/ML
2 VIAL (ML) INJECTION
Qty: 30 | Refills: 0 | Status: DISCONTINUED | OUTPATIENT
Start: 2018-01-10 | End: 2018-01-10

## 2018-01-10 RX ORDER — AER TRAVELER 0.5 G/1
1 SOLUTION RECTAL; TOPICAL EVERY 4 HOURS
Qty: 0 | Refills: 0 | Status: DISCONTINUED | OUTPATIENT
Start: 2018-01-10 | End: 2018-01-10

## 2018-01-10 RX ORDER — OXYCODONE HYDROCHLORIDE 5 MG/1
5 TABLET ORAL EVERY 4 HOURS
Qty: 0 | Refills: 0 | Status: DISCONTINUED | OUTPATIENT
Start: 2018-01-10 | End: 2018-01-12

## 2018-01-10 RX ORDER — HYDROCORTISONE 1 %
1 OINTMENT (GRAM) TOPICAL EVERY 4 HOURS
Qty: 0 | Refills: 0 | Status: DISCONTINUED | OUTPATIENT
Start: 2018-01-10 | End: 2018-01-10

## 2018-01-10 RX ORDER — ACETAMINOPHEN 500 MG
975 TABLET ORAL EVERY 6 HOURS
Qty: 0 | Refills: 0 | Status: COMPLETED | OUTPATIENT
Start: 2018-01-10 | End: 2018-12-09

## 2018-01-10 RX ORDER — KETOROLAC TROMETHAMINE 30 MG/ML
30 SYRINGE (ML) INJECTION ONCE
Qty: 0 | Refills: 0 | Status: DISCONTINUED | OUTPATIENT
Start: 2018-01-10 | End: 2018-01-10

## 2018-01-10 RX ORDER — DIPHENHYDRAMINE HCL 50 MG
25 CAPSULE ORAL EVERY 6 HOURS
Qty: 0 | Refills: 0 | Status: DISCONTINUED | OUTPATIENT
Start: 2018-01-11 | End: 2018-01-12

## 2018-01-10 RX ORDER — SODIUM CHLORIDE 9 MG/ML
3 INJECTION INTRAMUSCULAR; INTRAVENOUS; SUBCUTANEOUS EVERY 8 HOURS
Qty: 0 | Refills: 0 | Status: DISCONTINUED | OUTPATIENT
Start: 2018-01-10 | End: 2018-01-10

## 2018-01-10 RX ORDER — AER TRAVELER 0.5 G/1
1 SOLUTION RECTAL; TOPICAL EVERY 4 HOURS
Qty: 0 | Refills: 0 | Status: DISCONTINUED | OUTPATIENT
Start: 2018-01-11 | End: 2018-01-12

## 2018-01-10 RX ORDER — DIBUCAINE 1 %
1 OINTMENT (GRAM) RECTAL EVERY 4 HOURS
Qty: 0 | Refills: 0 | Status: DISCONTINUED | OUTPATIENT
Start: 2018-01-11 | End: 2018-01-12

## 2018-01-10 RX ORDER — DOCUSATE SODIUM 100 MG
100 CAPSULE ORAL
Qty: 0 | Refills: 0 | Status: DISCONTINUED | OUTPATIENT
Start: 2018-01-11 | End: 2018-01-12

## 2018-01-10 RX ORDER — SODIUM CHLORIDE 9 MG/ML
1000 INJECTION, SOLUTION INTRAVENOUS ONCE
Qty: 0 | Refills: 0 | Status: COMPLETED | OUTPATIENT
Start: 2018-01-10 | End: 2018-01-10

## 2018-01-10 RX ADMIN — SODIUM CHLORIDE 125 MILLILITER(S): 9 INJECTION, SOLUTION INTRAVENOUS at 19:20

## 2018-01-10 RX ADMIN — Medication 30 MILLIGRAM(S): at 20:31

## 2018-01-10 RX ADMIN — SODIUM CHLORIDE 2000 MILLILITER(S): 9 INJECTION, SOLUTION INTRAVENOUS at 17:43

## 2018-01-10 RX ADMIN — Medication 125 MILLIUNIT(S)/MIN: at 20:32

## 2018-01-10 RX ADMIN — Medication 30 MILLIGRAM(S): at 21:39

## 2018-01-10 RX ADMIN — Medication 1000 MILLIUNIT(S)/MIN: at 19:53

## 2018-01-10 RX ADMIN — Medication 2 MILLIUNIT(S)/MIN: at 18:30

## 2018-01-11 ENCOUNTER — TRANSCRIPTION ENCOUNTER (OUTPATIENT)
Age: 33
End: 2018-01-11

## 2018-01-11 LAB — T PALLIDUM AB TITR SER: NEGATIVE — SIGNIFICANT CHANGE UP

## 2018-01-11 RX ORDER — ACETAMINOPHEN 500 MG
975 TABLET ORAL EVERY 6 HOURS
Qty: 0 | Refills: 0 | Status: DISCONTINUED | OUTPATIENT
Start: 2018-01-11 | End: 2018-01-12

## 2018-01-11 RX ORDER — IBUPROFEN 200 MG
600 TABLET ORAL EVERY 6 HOURS
Qty: 0 | Refills: 0 | Status: DISCONTINUED | OUTPATIENT
Start: 2018-01-11 | End: 2018-01-12

## 2018-01-11 RX ORDER — OXYTOCIN 10 UNIT/ML
41.67 VIAL (ML) INJECTION
Qty: 20 | Refills: 0 | Status: DISCONTINUED | OUTPATIENT
Start: 2018-01-11 | End: 2018-01-11

## 2018-01-11 RX ORDER — PRAMOXINE HYDROCHLORIDE 150 MG/15G
1 AEROSOL, FOAM RECTAL EVERY 4 HOURS
Qty: 0 | Refills: 0 | Status: DISCONTINUED | OUTPATIENT
Start: 2018-01-11 | End: 2018-01-11

## 2018-01-11 RX ORDER — HYDROCORTISONE 1 %
1 OINTMENT (GRAM) TOPICAL EVERY 4 HOURS
Qty: 0 | Refills: 0 | Status: DISCONTINUED | OUTPATIENT
Start: 2018-01-11 | End: 2018-01-11

## 2018-01-11 RX ADMIN — Medication 975 MILLIGRAM(S): at 01:30

## 2018-01-11 RX ADMIN — Medication 600 MILLIGRAM(S): at 20:53

## 2018-01-11 RX ADMIN — Medication 100 MILLIGRAM(S): at 08:56

## 2018-01-11 RX ADMIN — Medication 600 MILLIGRAM(S): at 15:30

## 2018-01-11 RX ADMIN — Medication 600 MILLIGRAM(S): at 03:30

## 2018-01-11 RX ADMIN — Medication 975 MILLIGRAM(S): at 18:20

## 2018-01-11 RX ADMIN — Medication 975 MILLIGRAM(S): at 19:05

## 2018-01-11 RX ADMIN — Medication 600 MILLIGRAM(S): at 02:52

## 2018-01-11 RX ADMIN — SODIUM CHLORIDE 3 MILLILITER(S): 9 INJECTION INTRAMUSCULAR; INTRAVENOUS; SUBCUTANEOUS at 06:27

## 2018-01-11 RX ADMIN — Medication 600 MILLIGRAM(S): at 14:19

## 2018-01-11 RX ADMIN — Medication 600 MILLIGRAM(S): at 08:56

## 2018-01-11 RX ADMIN — Medication 975 MILLIGRAM(S): at 00:45

## 2018-01-11 RX ADMIN — Medication 600 MILLIGRAM(S): at 21:30

## 2018-01-11 RX ADMIN — Medication 600 MILLIGRAM(S): at 10:00

## 2018-01-11 NOTE — DISCHARGE NOTE OB - CARE PLAN
Principal Discharge DX:	Spontaneous vaginal delivery  Goal:	recovery  Instructions for follow-up, activity and diet:	pelvic rest x 6 weeks   regular diet  follow up 6 weeks

## 2018-01-11 NOTE — PROGRESS NOTE ADULT - SUBJECTIVE AND OBJECTIVE BOX
Chief Complaint   Patient presents with   • Routine Prenatal Visit     back pain, pelvic pressure, swelling         HPI  , 33w2d reports having increase low back pain and pelvic pressure pat 1-2 wks   Not certain if having ctx's   C/O LE edema - denies h/a's or visual disturbance  Working - wants to continue - not hard work - may leave if chooses      ROS  /84  Wt 232 lb (105 kg)  LMP 2017 (Approximate)  BMI 34.26 kg/m2 -See Prenatal Assessment    ROS:  GI: Nausea - No; Constipation - No; Diarrhea - No    Neuro: Headache - No; Visual change - No      EXAM  General Appearance: tired  Lungs: Breathing unlabored  Abdomen:  See flow sheet for Fundal ht, FM, FHT's  LE: 1+ pretibial pitting edema    MDM  Impression:  Problems/Risks: Pregnancy with Active Problems(s) &/or Complication(s)  twins   Hx C/S   hx of preeclampsia    LE edema and varicosity   Desires to contiue to work    Tests done today: NST   Topics discussed: continue to note good FM  adequate rest and fluids  Preventive measures of LE edema  PIH precautions  s/s PTL  encouraged questions - call prn    Tests next visit: none     OB History      Para Term  AB Living    2 1 1 0 0 1    SAB TAB Ectopic Multiple Live Births    0 0 0 0 1          Past Medical History:   Diagnosis Date   • Female infertility    • Migraine    • Multiple gestation    • PMS (premenstrual syndrome)    • Preeclampsia 2009       Past Surgical History:   Procedure Laterality Date   •  SECTION  2009       Family History   Problem Relation Age of Onset   • Hypertension Father    • Hyperlipidemia Father    • Lung cancer Father    • Diabetes Father    • Hypertension Mother    • Hyperlipidemia Mother    • Diabetes Mother        Social History     Social History   • Marital status:      Spouse name: N/A   • Number of children: N/A   • Years of education: N/A     Occupational History   • Not on file.     Social History Main Topics    • Smoking status: Never Smoker   • Smokeless tobacco: Never Used   • Alcohol use No   • Drug use: No   • Sexual activity: Yes     Partners: Male     Birth control/ protection: None     Other Topics Concern   • Not on file     Social History Narrative        Pt without complaints  Vital Signs Last 24 Hrs  T(C): 36.9 (11 Jan 2018 05:28), Max: 37 (11 Jan 2018 02:01)  T(F): 98.4 (11 Jan 2018 05:28), Max: 98.6 (11 Jan 2018 02:01)  HR: 85 (11 Jan 2018 05:28) (84 - 99)  BP: 100/65 (11 Jan 2018 05:28) (98/57 - 106/60)  BP(mean): --  RR: 18 (11 Jan 2018 05:28) (16 - 18)  SpO2: 100% (11 Jan 2018 05:28) (99% - 100%)  Blood Type: O Positive  abdomen soft, non-tender, fundus firm  extremities non tender  s/p nsd  doing well  d/c home in am

## 2018-01-11 NOTE — DISCHARGE NOTE OB - CARE PROVIDER_API CALL
Macarena Agrawal), Obstetrics and Gynecology  [address]  [address]  [place] [address]  Phone: [phone]  Fax: [phone]

## 2018-01-11 NOTE — DISCHARGE NOTE OB - MEDICATION SUMMARY - MEDICATIONS TO TAKE
I will START or STAY ON the medications listed below when I get home from the hospital:    Prenatal Multivitamins oral tablet (obsolete)  -- Indication: For vaginal delivery I will START or STAY ON the medications listed below when I get home from the hospital:    acetaminophen 325 mg oral tablet  -- 3 tab(s) by mouth every 6 hours, As Needed  -- Indication: For moderate pain    ibuprofen 600 mg oral tablet  -- 1 tab(s) by mouth every 6 hours, As Needed  -- Indication: For moderate pain    Prenatal Multivitamins with Folic Acid 1 mg oral tablet  -- 1 tab(s) by mouth once a day  -- Indication: For Supplement

## 2018-01-11 NOTE — DISCHARGE NOTE OB - PATIENT PORTAL LINK FT
“You can access the FollowHealth Patient Portal, offered by [facility], by registering with the following website: [email or web address]”

## 2018-01-11 NOTE — DISCHARGE NOTE OB - CARE PROVIDERS DIRECT ADDRESSES
[email or web address]

## 2018-01-11 NOTE — DISCHARGE NOTE OB - MATERIALS PROVIDED
Guide to Postpartum Care/Birth Certificate Instructions/Bottle Feeding Log/Shaken Baby Prevention Handout/Breastfeeding Log/Vaccinations

## 2018-01-12 VITALS
DIASTOLIC BLOOD PRESSURE: 56 MMHG | RESPIRATION RATE: 18 BRPM | OXYGEN SATURATION: 100 % | SYSTOLIC BLOOD PRESSURE: 106 MMHG | TEMPERATURE: 98 F | HEART RATE: 79 BPM

## 2018-01-12 RX ORDER — IBUPROFEN 200 MG
1 TABLET ORAL
Qty: 0 | Refills: 0 | COMMUNITY
Start: 2018-01-12

## 2018-01-12 RX ORDER — ACETAMINOPHEN 500 MG
3 TABLET ORAL
Qty: 0 | Refills: 0 | COMMUNITY
Start: 2018-01-12

## 2018-01-12 RX ADMIN — OXYCODONE HYDROCHLORIDE 5 MILLIGRAM(S): 5 TABLET ORAL at 01:00

## 2018-01-12 RX ADMIN — Medication 600 MILLIGRAM(S): at 10:00

## 2018-01-12 RX ADMIN — Medication 975 MILLIGRAM(S): at 06:17

## 2018-01-12 RX ADMIN — Medication 975 MILLIGRAM(S): at 06:32

## 2018-01-12 RX ADMIN — Medication 600 MILLIGRAM(S): at 03:40

## 2018-01-12 RX ADMIN — Medication 975 MILLIGRAM(S): at 00:29

## 2018-01-12 RX ADMIN — OXYCODONE HYDROCHLORIDE 5 MILLIGRAM(S): 5 TABLET ORAL at 00:28

## 2018-01-12 RX ADMIN — Medication 600 MILLIGRAM(S): at 08:59

## 2018-01-12 RX ADMIN — Medication 975 MILLIGRAM(S): at 01:00

## 2018-01-12 RX ADMIN — Medication 600 MILLIGRAM(S): at 04:15

## 2018-01-16 ENCOUNTER — APPOINTMENT (OUTPATIENT)
Dept: OBGYN | Facility: CLINIC | Age: 33
End: 2018-01-16

## 2018-01-17 ENCOUNTER — APPOINTMENT (OUTPATIENT)
Dept: OBGYN | Facility: CLINIC | Age: 33
End: 2018-01-17

## 2018-01-23 ENCOUNTER — APPOINTMENT (OUTPATIENT)
Dept: OBGYN | Facility: CLINIC | Age: 33
End: 2018-01-23

## 2018-01-24 ENCOUNTER — APPOINTMENT (OUTPATIENT)
Dept: OBGYN | Facility: CLINIC | Age: 33
End: 2018-01-24

## 2018-02-27 ENCOUNTER — APPOINTMENT (OUTPATIENT)
Dept: OBGYN | Facility: CLINIC | Age: 33
End: 2018-02-27

## 2018-03-28 ENCOUNTER — APPOINTMENT (OUTPATIENT)
Dept: OBGYN | Facility: CLINIC | Age: 33
End: 2018-03-28
Payer: COMMERCIAL

## 2018-03-28 VITALS
SYSTOLIC BLOOD PRESSURE: 100 MMHG | BODY MASS INDEX: 26.13 KG/M2 | HEIGHT: 62 IN | DIASTOLIC BLOOD PRESSURE: 60 MMHG | WEIGHT: 142 LBS

## 2018-03-28 DIAGNOSIS — R10.9 OTHER SPECIFIED PREGNANCY RELATED CONDITIONS, UNSPECIFIED TRIMESTER: ICD-10-CM

## 2018-03-28 DIAGNOSIS — Z3A.31 31 WEEKS GESTATION OF PREGNANCY: ICD-10-CM

## 2018-03-28 DIAGNOSIS — Z3A.37 37 WEEKS GESTATION OF PREGNANCY: ICD-10-CM

## 2018-03-28 DIAGNOSIS — O26.899 OTHER SPECIFIED PREGNANCY RELATED CONDITIONS, UNSPECIFIED TRIMESTER: ICD-10-CM

## 2018-03-28 DIAGNOSIS — Z3A.36 36 WEEKS GESTATION OF PREGNANCY: ICD-10-CM

## 2018-03-28 DIAGNOSIS — N97.9 FEMALE INFERTILITY, UNSPECIFIED: ICD-10-CM

## 2018-03-28 DIAGNOSIS — Z3A.29 29 WEEKS GESTATION OF PREGNANCY: ICD-10-CM

## 2018-03-28 DIAGNOSIS — Z23 ENCOUNTER FOR IMMUNIZATION: ICD-10-CM

## 2018-03-28 DIAGNOSIS — O99.820 STREPTOCOCCUS B CARRIER STATE COMPLICATING PREGNANCY: ICD-10-CM

## 2018-03-28 DIAGNOSIS — O46.92 ANTEPARTUM HEMORRHAGE, UNSPECIFIED, SECOND TRIMESTER: ICD-10-CM

## 2018-03-28 DIAGNOSIS — N91.5 OLIGOMENORRHEA, UNSPECIFIED: ICD-10-CM

## 2018-03-28 DIAGNOSIS — Z3A.16 16 WEEKS GESTATION OF PREGNANCY: ICD-10-CM

## 2018-03-28 DIAGNOSIS — N91.1 SECONDARY AMENORRHEA: ICD-10-CM

## 2018-03-28 DIAGNOSIS — N92.6 IRREGULAR MENSTRUATION, UNSPECIFIED: ICD-10-CM

## 2018-03-28 DIAGNOSIS — Z3A.26 26 WEEKS GESTATION OF PREGNANCY: ICD-10-CM

## 2018-03-28 DIAGNOSIS — Z3A.33 33 WEEKS GESTATION OF PREGNANCY: ICD-10-CM

## 2018-03-28 DIAGNOSIS — Z3A.35 35 WEEKS GESTATION OF PREGNANCY: ICD-10-CM

## 2018-03-28 DIAGNOSIS — Z86.39 PERSONAL HISTORY OF OTHER ENDOCRINE, NUTRITIONAL AND METABOLIC DISEASE: ICD-10-CM

## 2018-03-28 DIAGNOSIS — Z87.59 PERSONAL HISTORY OF OTHER COMPLICATIONS OF PREGNANCY, CHILDBIRTH AND THE PUERPERIUM: ICD-10-CM

## 2018-03-28 DIAGNOSIS — B37.3 CANDIDIASIS OF VULVA AND VAGINA: ICD-10-CM

## 2018-03-28 DIAGNOSIS — Z3A.13 13 WEEKS GESTATION OF PREGNANCY: ICD-10-CM

## 2018-03-28 PROCEDURE — 0503F POSTPARTUM CARE VISIT: CPT

## 2018-10-22 ENCOUNTER — APPOINTMENT (OUTPATIENT)
Dept: OBGYN | Facility: CLINIC | Age: 33
End: 2018-10-22
Payer: COMMERCIAL

## 2018-10-22 VITALS — SYSTOLIC BLOOD PRESSURE: 100 MMHG | DIASTOLIC BLOOD PRESSURE: 64 MMHG

## 2018-10-22 DIAGNOSIS — N91.2 AMENORRHEA, UNSPECIFIED: ICD-10-CM

## 2018-10-22 DIAGNOSIS — Z11.3 ENCOUNTER FOR SCREENING FOR INFECTIONS WITH A PREDOMINANTLY SEXUAL MODE OF TRANSMISSION: ICD-10-CM

## 2018-10-22 PROCEDURE — 99213 OFFICE O/P EST LOW 20 MIN: CPT | Mod: 25

## 2018-10-22 PROCEDURE — 76830 TRANSVAGINAL US NON-OB: CPT

## 2018-10-25 LAB
C TRACH RRNA SPEC QL NAA+PROBE: NOT DETECTED
N GONORRHOEA RRNA SPEC QL NAA+PROBE: NOT DETECTED
SOURCE AMPLIFICATION: NORMAL

## 2018-10-30 ENCOUNTER — APPOINTMENT (OUTPATIENT)
Dept: OBGYN | Facility: CLINIC | Age: 33
End: 2018-10-30
Payer: COMMERCIAL

## 2018-10-30 ENCOUNTER — APPOINTMENT (OUTPATIENT)
Dept: OBGYN | Facility: CLINIC | Age: 33
End: 2018-10-30

## 2018-10-30 PROCEDURE — 99213 OFFICE O/P EST LOW 20 MIN: CPT | Mod: 25

## 2018-10-30 PROCEDURE — 76830 TRANSVAGINAL US NON-OB: CPT

## 2018-10-31 ENCOUNTER — OUTPATIENT (OUTPATIENT)
Dept: OUTPATIENT SERVICES | Facility: HOSPITAL | Age: 33
LOS: 1 days | End: 2018-10-31

## 2018-10-31 VITALS
RESPIRATION RATE: 16 BRPM | DIASTOLIC BLOOD PRESSURE: 70 MMHG | SYSTOLIC BLOOD PRESSURE: 110 MMHG | HEART RATE: 78 BPM | TEMPERATURE: 99 F | HEIGHT: 61 IN | WEIGHT: 141.1 LBS

## 2018-10-31 DIAGNOSIS — Z33.2 ENCOUNTER FOR ELECTIVE TERMINATION OF PREGNANCY: ICD-10-CM

## 2018-10-31 DIAGNOSIS — Z98.890 OTHER SPECIFIED POSTPROCEDURAL STATES: Chronic | ICD-10-CM

## 2018-10-31 LAB
BLD GP AB SCN SERPL QL: NEGATIVE — SIGNIFICANT CHANGE UP
HCT VFR BLD CALC: 36.9 % — SIGNIFICANT CHANGE UP (ref 34.5–45)
HGB BLD-MCNC: 11.7 G/DL — SIGNIFICANT CHANGE UP (ref 11.5–15.5)
MCHC RBC-ENTMCNC: 26.7 PG — LOW (ref 27–34)
MCHC RBC-ENTMCNC: 31.7 % — LOW (ref 32–36)
MCV RBC AUTO: 84.2 FL — SIGNIFICANT CHANGE UP (ref 80–100)
NRBC # FLD: 0 — SIGNIFICANT CHANGE UP
PLATELET # BLD AUTO: 340 K/UL — SIGNIFICANT CHANGE UP (ref 150–400)
PMV BLD: 10.6 FL — SIGNIFICANT CHANGE UP (ref 7–13)
RBC # BLD: 4.38 M/UL — SIGNIFICANT CHANGE UP (ref 3.8–5.2)
RBC # FLD: 13.8 % — SIGNIFICANT CHANGE UP (ref 10.3–14.5)
RH IG SCN BLD-IMP: POSITIVE — SIGNIFICANT CHANGE UP
WBC # BLD: 8.09 K/UL — SIGNIFICANT CHANGE UP (ref 3.8–10.5)
WBC # FLD AUTO: 8.09 K/UL — SIGNIFICANT CHANGE UP (ref 3.8–10.5)

## 2018-10-31 NOTE — H&P PST ADULT - NEGATIVE OPHTHALMOLOGIC SYMPTOMS
no photophobia/no diplopia/no blurred vision L/no blurred vision R/no lacrimation L/no lacrimation R

## 2018-10-31 NOTE — H&P PST ADULT - CORNEAL ABRASION RISK
Denies hx of corneal abrasion. abrasion in right eye in 2015 hx of corneal abrasion. abrasion in right eye in 2015

## 2018-10-31 NOTE — H&P PST ADULT - HISTORY OF PRESENT ILLNESS
33yr old female  4 para 2, 6wk pregnant, presents to [facility] for pre surgical evaluation for elective termination of pregnancy. Scheduled for Dilatation and vacuum curettage on 18.

## 2018-11-07 ENCOUNTER — TRANSCRIPTION ENCOUNTER (OUTPATIENT)
Age: 33
End: 2018-11-07

## 2018-11-08 ENCOUNTER — APPOINTMENT (OUTPATIENT)
Dept: OBGYN | Facility: CLINIC | Age: 33
End: 2018-11-08

## 2018-11-08 ENCOUNTER — RESULT REVIEW (OUTPATIENT)
Age: 33
End: 2018-11-08

## 2018-11-08 ENCOUNTER — OUTPATIENT (OUTPATIENT)
Dept: OUTPATIENT SERVICES | Facility: HOSPITAL | Age: 33
LOS: 1 days | Discharge: HOME | End: 2018-11-08
Payer: COMMERCIAL

## 2018-11-08 VITALS
OXYGEN SATURATION: 100 % | HEIGHT: 61 IN | RESPIRATION RATE: 16 BRPM | HEART RATE: 87 BPM | DIASTOLIC BLOOD PRESSURE: 47 MMHG | TEMPERATURE: 98 F | WEIGHT: 191.8 LBS | SYSTOLIC BLOOD PRESSURE: 96 MMHG

## 2018-11-08 VITALS
OXYGEN SATURATION: 100 % | DIASTOLIC BLOOD PRESSURE: 61 MMHG | RESPIRATION RATE: 14 BRPM | TEMPERATURE: 98 F | HEART RATE: 72 BPM | SYSTOLIC BLOOD PRESSURE: 97 MMHG

## 2018-11-08 DIAGNOSIS — Z33.2 ENCOUNTER FOR ELECTIVE TERMINATION OF PREGNANCY: ICD-10-CM

## 2018-11-08 DIAGNOSIS — Z98.890 OTHER SPECIFIED POSTPROCEDURAL STATES: Chronic | ICD-10-CM

## 2018-11-08 LAB
BLD GP AB SCN SERPL QL: NEGATIVE — SIGNIFICANT CHANGE UP
RH IG SCN BLD-IMP: POSITIVE — SIGNIFICANT CHANGE UP

## 2018-11-08 PROCEDURE — 88304 TISSUE EXAM BY PATHOLOGIST: CPT | Mod: 26

## 2018-11-08 PROCEDURE — 59840 INDUCED ABORTION D&C: CPT

## 2018-11-08 RX ORDER — SODIUM CHLORIDE 9 MG/ML
3 INJECTION INTRAMUSCULAR; INTRAVENOUS; SUBCUTANEOUS ONCE
Qty: 0 | Refills: 0 | Status: DISCONTINUED | OUTPATIENT
Start: 2018-11-08 | End: 2018-11-09

## 2018-11-08 RX ORDER — SODIUM CHLORIDE 9 MG/ML
1000 INJECTION, SOLUTION INTRAVENOUS
Qty: 0 | Refills: 0 | Status: DISCONTINUED | OUTPATIENT
Start: 2018-11-08 | End: 2018-11-09

## 2018-11-08 NOTE — ASU DISCHARGE PLAN (ADULT/PEDIATRIC). - ACTIVITY LEVEL
for 2 weeks/no douching/no tampons/nothing per vagina/no intercourse/no tub baths for 2 weeks/no tub baths/no intercourse/no douching/nothing per vagina/no tampons/no sports/gym/no exercise/no heavy lifting

## 2018-11-08 NOTE — ASU DISCHARGE PLAN (ADULT/PEDIATRIC). - NURSING INSTRUCTIONS
See medication reconciliation record  You were given Toradol for pain management. Please DO Not take Motrin/Ibuprofen (NSAIDS) for the next 6 hours (Until  6pm    ).   You were given IV Tylenol for pain management.  Please DO NOT take tylenol for the next 6-8 hours (after 6pm  ). Please do not exceed 3000mg in 24hours.

## 2018-11-08 NOTE — ASU DISCHARGE PLAN (ADULT/PEDIATRIC). - COMMENTS
Surgical Unit will call you on the next business day to follow up. [facility] is open Monday - Friday.

## 2018-11-08 NOTE — BRIEF OPERATIVE NOTE - OPERATION/FINDINGS
Exam under anesthesia revealed anteverted uterus, 8w sized. Adnexae nonpalpable bilaterally. Dilation vacuum curettage performed with products of conception removed.

## 2018-11-08 NOTE — BRIEF OPERATIVE NOTE - PROCEDURE
<<-----Click on this checkbox to enter Procedure Dilation and curettage, uterus, using suction  11/08/2018    Active  ALTERS

## 2018-11-08 NOTE — ASU DISCHARGE PLAN (ADULT/PEDIATRIC). - NOTIFY
Bleeding that does not stop/Pain not relieved by Medications/Inability to Tolerate Liquids or Foods/GYN Fever>100.4/Unable to Urinate Persistent Nausea and Vomiting/Bleeding that does not stop/Unable to Urinate/Pain not relieved by Medications/GYN Fever>100.4/Inability to Tolerate Liquids or Foods

## 2018-11-13 LAB — SURGICAL PATHOLOGY STUDY: SIGNIFICANT CHANGE UP

## 2018-12-18 PROBLEM — D64.9 ANEMIA, UNSPECIFIED: Chronic | Status: ACTIVE | Noted: 2018-10-31

## 2018-12-18 PROBLEM — E07.9 DISORDER OF THYROID, UNSPECIFIED: Chronic | Status: ACTIVE | Noted: 2018-10-31

## 2018-12-19 ENCOUNTER — APPOINTMENT (OUTPATIENT)
Dept: OBGYN | Facility: CLINIC | Age: 33
End: 2018-12-19
Payer: COMMERCIAL

## 2018-12-19 VITALS — SYSTOLIC BLOOD PRESSURE: 98 MMHG | DIASTOLIC BLOOD PRESSURE: 60 MMHG

## 2018-12-19 DIAGNOSIS — Z09 ENCOUNTER FOR FOLLOW-UP EXAMINATION AFTER COMPLETED TREATMENT FOR CONDITIONS OTHER THAN MALIGNANT NEOPLASM: ICD-10-CM

## 2018-12-19 PROCEDURE — 99024 POSTOP FOLLOW-UP VISIT: CPT

## 2019-04-01 PROBLEM — N91.1 SECONDARY AMENORRHEA: Status: RESOLVED | Noted: 2017-06-07 | Resolved: 2019-04-01

## 2019-10-21 ENCOUNTER — APPOINTMENT (OUTPATIENT)
Dept: OBGYN | Facility: CLINIC | Age: 34
End: 2019-10-21
Payer: COMMERCIAL

## 2019-10-21 VITALS — DIASTOLIC BLOOD PRESSURE: 68 MMHG | SYSTOLIC BLOOD PRESSURE: 106 MMHG

## 2019-10-21 DIAGNOSIS — Z30.430 ENCOUNTER FOR INSERTION OF INTRAUTERINE CONTRACEPTIVE DEVICE: ICD-10-CM

## 2019-10-21 PROCEDURE — 76830 TRANSVAGINAL US NON-OB: CPT

## 2019-10-21 PROCEDURE — 58300 INSERT INTRAUTERINE DEVICE: CPT

## 2019-10-21 NOTE — PROCEDURE
[IUD Placement] : intrauterine device (IUD) placement [Prevention of Pregnancy] : prevention of pregnancy [Betadine] : Prepped with Betadine [CONSENT OBTAINED] : written consent was obtained prior to the procedure. [Tenaculum] : a single toothed tenaculum [Easy Passage] : allowed easy passage of a uterine sound without dilation [Mirena IUD] : The Mirena IUD was inserted past the internal cervical os. The IUD was then gently inserted upwards toward the fundus.  The IUD strings were cut to an appropriate length. [Lot Number: ___] : IUD lot number: [unfilled] [Tolerated Well] : the patient tolerated the procedure well [No Complications] : there were no complications

## 2020-01-27 ENCOUNTER — APPOINTMENT (OUTPATIENT)
Dept: OBGYN | Facility: CLINIC | Age: 35
End: 2020-01-27

## 2020-02-10 ENCOUNTER — APPOINTMENT (OUTPATIENT)
Dept: OBGYN | Facility: CLINIC | Age: 35
End: 2020-02-10

## 2021-06-02 ENCOUNTER — NON-APPOINTMENT (OUTPATIENT)
Age: 36
End: 2021-06-02

## 2021-06-02 ENCOUNTER — APPOINTMENT (OUTPATIENT)
Dept: OBGYN | Facility: CLINIC | Age: 36
End: 2021-06-02
Payer: COMMERCIAL

## 2021-06-02 VITALS
WEIGHT: 150 LBS | HEIGHT: 62 IN | BODY MASS INDEX: 27.6 KG/M2 | SYSTOLIC BLOOD PRESSURE: 100 MMHG | DIASTOLIC BLOOD PRESSURE: 66 MMHG

## 2021-06-02 DIAGNOSIS — Z30.432 ENCOUNTER FOR REMOVAL OF INTRAUTERINE CONTRACEPTIVE DEVICE: ICD-10-CM

## 2021-06-02 DIAGNOSIS — Z80.41 FAMILY HISTORY OF MALIGNANT NEOPLASM OF OVARY: ICD-10-CM

## 2021-06-02 DIAGNOSIS — Z01.419 ENCOUNTER FOR GYNECOLOGICAL EXAMINATION (GENERAL) (ROUTINE) W/OUT ABNORMAL FINDINGS: ICD-10-CM

## 2021-06-02 PROCEDURE — 58301 REMOVE INTRAUTERINE DEVICE: CPT

## 2021-06-02 PROCEDURE — 99072 ADDL SUPL MATRL&STAF TM PHE: CPT

## 2021-06-02 PROCEDURE — 99395 PREV VISIT EST AGE 18-39: CPT | Mod: 25

## 2021-06-02 RX ORDER — IBUPROFEN 800 MG/1
TABLET, FILM COATED ORAL
Refills: 0 | Status: DISCONTINUED | COMMUNITY
End: 2021-06-02

## 2021-06-06 PROBLEM — Z80.41 FAMILY HISTORY OF MALIGNANT NEOPLASM OF OVARY: Status: ACTIVE | Noted: 2021-06-02

## 2021-06-10 LAB — CYTOLOGY CVX/VAG DOC THIN PREP: ABNORMAL

## 2021-06-21 ENCOUNTER — LABORATORY RESULT (OUTPATIENT)
Age: 36
End: 2021-06-21

## 2021-06-21 ENCOUNTER — APPOINTMENT (OUTPATIENT)
Dept: OBGYN | Facility: CLINIC | Age: 36
End: 2021-06-21
Payer: COMMERCIAL

## 2021-06-21 DIAGNOSIS — R87.612 LOW GRADE SQUAMOUS INTRAEPITHELIAL LESION ON CYTOLOGIC SMEAR OF CERVIX (LGSIL): ICD-10-CM

## 2021-06-21 PROCEDURE — 57454 BX/CURETT OF CERVIX W/SCOPE: CPT

## 2021-06-21 PROCEDURE — 99072 ADDL SUPL MATRL&STAF TM PHE: CPT

## 2021-09-20 NOTE — DISCHARGE NOTE OB - FUNCTIONAL SCREEN CURRENT LEVEL: AMBULATION, MLM
(0) independent Complex Repair And Dorsal Nasal Flap Text: The defect edges were debeveled with a #15 scalpel blade.  The primary defect was closed partially with a complex linear closure.  Given the location of the remaining defect, shape of the defect and the proximity to free margins a dorsal nasal flap was deemed most appropriate for complete closure of the defect.  Using a sterile surgical marker, an appropriate flap was drawn incorporating the defect and placing the expected incisions within the relaxed skin tension lines where possible.    The area thus outlined was incised deep to adipose tissue with a #15 scalpel blade.  The skin margins were undermined to an appropriate distance in all directions utilizing iris scissors.

## 2022-01-03 ENCOUNTER — APPOINTMENT (OUTPATIENT)
Dept: OBGYN | Facility: CLINIC | Age: 37
End: 2022-01-03

## 2022-07-20 ENCOUNTER — APPOINTMENT (OUTPATIENT)
Dept: OBGYN | Facility: CLINIC | Age: 37
End: 2022-07-20

## 2022-08-16 ENCOUNTER — APPOINTMENT (OUTPATIENT)
Dept: POPULATION HEALTH | Facility: CLINIC | Age: 37
End: 2022-08-16

## 2024-02-24 ENCOUNTER — EMERGENCY (EMERGENCY)
Facility: HOSPITAL | Age: 39
LOS: 1 days | Discharge: HOME | End: 2024-02-24
Admitting: STUDENT IN AN ORGANIZED HEALTH CARE EDUCATION/TRAINING PROGRAM
Payer: COMMERCIAL

## 2024-02-24 VITALS
OXYGEN SATURATION: 100 % | SYSTOLIC BLOOD PRESSURE: 114 MMHG | HEART RATE: 100 BPM | TEMPERATURE: 98 F | RESPIRATION RATE: 16 BRPM | DIASTOLIC BLOOD PRESSURE: 73 MMHG

## 2024-02-24 VITALS
DIASTOLIC BLOOD PRESSURE: 68 MMHG | TEMPERATURE: 98 F | SYSTOLIC BLOOD PRESSURE: 101 MMHG | HEART RATE: 77 BPM | OXYGEN SATURATION: 100 % | RESPIRATION RATE: 16 BRPM

## 2024-02-24 DIAGNOSIS — Z98.890 OTHER SPECIFIED POSTPROCEDURAL STATES: Chronic | ICD-10-CM

## 2024-02-24 LAB
ALBUMIN SERPL ELPH-MCNC: 4.6 G/DL — SIGNIFICANT CHANGE UP (ref 3.3–5)
ALP SERPL-CCNC: 72 U/L — SIGNIFICANT CHANGE UP (ref 40–120)
ALT FLD-CCNC: 18 U/L — SIGNIFICANT CHANGE UP (ref 4–33)
ANION GAP SERPL CALC-SCNC: 11 MMOL/L — SIGNIFICANT CHANGE UP (ref 7–14)
APPEARANCE UR: ABNORMAL
AST SERPL-CCNC: 33 U/L — HIGH (ref 4–32)
BACTERIA # UR AUTO: NEGATIVE /HPF — SIGNIFICANT CHANGE UP
BASOPHILS # BLD AUTO: 0.02 K/UL — SIGNIFICANT CHANGE UP (ref 0–0.2)
BASOPHILS NFR BLD AUTO: 0.3 % — SIGNIFICANT CHANGE UP (ref 0–2)
BILIRUB SERPL-MCNC: 0.3 MG/DL — SIGNIFICANT CHANGE UP (ref 0.2–1.2)
BILIRUB UR-MCNC: NEGATIVE — SIGNIFICANT CHANGE UP
BLD GP AB SCN SERPL QL: NEGATIVE — SIGNIFICANT CHANGE UP
BUN SERPL-MCNC: 13 MG/DL — SIGNIFICANT CHANGE UP (ref 7–23)
CALCIUM SERPL-MCNC: 9.3 MG/DL — SIGNIFICANT CHANGE UP (ref 8.4–10.5)
CAST: 1 /LPF — SIGNIFICANT CHANGE UP (ref 0–4)
CHLORIDE SERPL-SCNC: 103 MMOL/L — SIGNIFICANT CHANGE UP (ref 98–107)
CO2 SERPL-SCNC: 25 MMOL/L — SIGNIFICANT CHANGE UP (ref 22–31)
COLOR SPEC: YELLOW — SIGNIFICANT CHANGE UP
CREAT SERPL-MCNC: 0.59 MG/DL — SIGNIFICANT CHANGE UP (ref 0.5–1.3)
DIFF PNL FLD: ABNORMAL
EGFR: 120 ML/MIN/1.73M2 — SIGNIFICANT CHANGE UP
EOSINOPHIL # BLD AUTO: 0.1 K/UL — SIGNIFICANT CHANGE UP (ref 0–0.5)
EOSINOPHIL NFR BLD AUTO: 1.5 % — SIGNIFICANT CHANGE UP (ref 0–6)
GLUCOSE SERPL-MCNC: 76 MG/DL — SIGNIFICANT CHANGE UP (ref 70–99)
GLUCOSE UR QL: 100 MG/DL
HCG SERPL-ACNC: 1298 MIU/ML — SIGNIFICANT CHANGE UP
HCT VFR BLD CALC: 42.1 % — SIGNIFICANT CHANGE UP (ref 34.5–45)
HGB BLD-MCNC: 13.6 G/DL — SIGNIFICANT CHANGE UP (ref 11.5–15.5)
IANC: 4.11 K/UL — SIGNIFICANT CHANGE UP (ref 1.8–7.4)
IMM GRANULOCYTES NFR BLD AUTO: 0.3 % — SIGNIFICANT CHANGE UP (ref 0–0.9)
KETONES UR-MCNC: 15 MG/DL
LEUKOCYTE ESTERASE UR-ACNC: NEGATIVE — SIGNIFICANT CHANGE UP
LYMPHOCYTES # BLD AUTO: 1.81 K/UL — SIGNIFICANT CHANGE UP (ref 1–3.3)
LYMPHOCYTES # BLD AUTO: 27.5 % — SIGNIFICANT CHANGE UP (ref 13–44)
MCHC RBC-ENTMCNC: 28.3 PG — SIGNIFICANT CHANGE UP (ref 27–34)
MCHC RBC-ENTMCNC: 32.3 GM/DL — SIGNIFICANT CHANGE UP (ref 32–36)
MCV RBC AUTO: 87.5 FL — SIGNIFICANT CHANGE UP (ref 80–100)
MONOCYTES # BLD AUTO: 0.52 K/UL — SIGNIFICANT CHANGE UP (ref 0–0.9)
MONOCYTES NFR BLD AUTO: 7.9 % — SIGNIFICANT CHANGE UP (ref 2–14)
NEUTROPHILS # BLD AUTO: 4.11 K/UL — SIGNIFICANT CHANGE UP (ref 1.8–7.4)
NEUTROPHILS NFR BLD AUTO: 62.5 % — SIGNIFICANT CHANGE UP (ref 43–77)
NITRITE UR-MCNC: NEGATIVE — SIGNIFICANT CHANGE UP
NRBC # BLD: 0 /100 WBCS — SIGNIFICANT CHANGE UP (ref 0–0)
NRBC # FLD: 0 K/UL — SIGNIFICANT CHANGE UP (ref 0–0)
PH UR: 7 — SIGNIFICANT CHANGE UP (ref 5–8)
PLATELET # BLD AUTO: 338 K/UL — SIGNIFICANT CHANGE UP (ref 150–400)
POTASSIUM SERPL-MCNC: 4.2 MMOL/L — SIGNIFICANT CHANGE UP (ref 3.5–5.3)
POTASSIUM SERPL-SCNC: 4.2 MMOL/L — SIGNIFICANT CHANGE UP (ref 3.5–5.3)
PROT SERPL-MCNC: 8 G/DL — SIGNIFICANT CHANGE UP (ref 6–8.3)
PROT UR-MCNC: SIGNIFICANT CHANGE UP MG/DL
RBC # BLD: 4.81 M/UL — SIGNIFICANT CHANGE UP (ref 3.8–5.2)
RBC # FLD: 13.3 % — SIGNIFICANT CHANGE UP (ref 10.3–14.5)
RBC CASTS # UR COMP ASSIST: 11 /HPF — HIGH (ref 0–4)
RH IG SCN BLD-IMP: POSITIVE — SIGNIFICANT CHANGE UP
SODIUM SERPL-SCNC: 139 MMOL/L — SIGNIFICANT CHANGE UP (ref 135–145)
SP GR SPEC: 1.03 — SIGNIFICANT CHANGE UP (ref 1–1.03)
SQUAMOUS # UR AUTO: 2 /HPF — SIGNIFICANT CHANGE UP (ref 0–5)
UROBILINOGEN FLD QL: 1 MG/DL — SIGNIFICANT CHANGE UP (ref 0.2–1)
WBC # BLD: 6.58 K/UL — SIGNIFICANT CHANGE UP (ref 3.8–10.5)
WBC # FLD AUTO: 6.58 K/UL — SIGNIFICANT CHANGE UP (ref 3.8–10.5)
WBC UR QL: 1 /HPF — SIGNIFICANT CHANGE UP (ref 0–5)

## 2024-02-24 PROCEDURE — 76817 TRANSVAGINAL US OBSTETRIC: CPT | Mod: 26

## 2024-02-24 PROCEDURE — 99285 EMERGENCY DEPT VISIT HI MDM: CPT

## 2024-02-24 RX ORDER — ACETAMINOPHEN 500 MG
1000 TABLET ORAL ONCE
Refills: 0 | Status: COMPLETED | OUTPATIENT
Start: 2024-02-24 | End: 2024-02-24

## 2024-02-24 RX ADMIN — Medication 400 MILLIGRAM(S): at 19:12

## 2024-02-24 NOTE — ED ADULT NURSE NOTE - OBJECTIVE STATEMENT
pt alert,oriented x3. reports vag bleeding with cramping- approximately 6 wks pregnant- states has not seen gyn md yet. iv access,labs sent. will continue to monitor. awaits ultrasound. medicated as ordered  for c/o pain

## 2024-02-24 NOTE — ED PROVIDER NOTE - NSFOLLOWUPINSTRUCTIONS_ED_ALL_ED_FT
Advance activity as tolerated.  Continue all previously prescribed medications as directed unless otherwise instructed.  Practice pelvic rest -- no sexual intercourse, no foreign bodies, no tampons in vagina.  Return to the emergency department in 2 days for repeat blood work (hcg).  Follow up with your primary care physician and OB/GYN (referral list provided or call [phone] to make an appointment with the OB/GYN clinic)  in 48-72 hours- bring copies of your results.  Return to the Emergency Department for worsening or persistent symptoms, including but not limited to worsening/persistent pain, heavy vaginal bleeding requiring more than or equal to 2 pads in 1 hour, lightheadedness, passing out, chest pain, shortness of breath, and/or ANY NEW OR CONCERNING SYMPTOMS. If you have issues obtaining follow up, please call: [phone] to obtain a doctor or specialist who takes your insurance in your area.  You may call [phone] to make an appointment with the internal medicine clinic.     THREATENED MISCARRIAGE - General Information    Threatened Miscarriage    WHAT YOU NEED TO KNOW:    What is a threatened miscarriage? A threatened miscarriage occurs when you have vaginal bleeding within the first 20 weeks of pregnancy. It means that a miscarriage may happen. A threatened miscarriage may also be called a threatened .    What causes bleeding or spotting during pregnancy? The cause of your bleeding or spotting may not be known. The following are possible causes of vaginal bleeding during pregnancy:    Polyps, fibroids, or cysts in the uterus    Sexual intercourse    Infection    Where or how the placenta is attached to your uterus (womb)    A problem with your fetus (unborn baby)    Drug or alcohol use    Ectopic pregnancy (the fetus is growing outside of the uterus)  What are the signs and symptoms of a threatened miscarriage?    Vaginal spotting or bleeding    Pain or cramping in your abdomen or lower back  How is a threatened miscarriage diagnosed? Tell your healthcare provider when your bleeding started. You may need any of the following:    Blood tests may show infection, check your level of pregnancy hormone, or give information about your overall health.    A pelvic exam checks the size of your uterus. A pelvic exam also checks your cervix for dilation (opening).    A pelvic ultrasound shows pictures of the fetus and finds his or her heartbeat. A pelvic ultrasound also looks at your reproductive organs and monitors the amount of bleeding.  How is a threatened miscarriage managed? The following may help you manage your symptoms and decrease your risk for a miscarriage:    Do not put anything in your vagina. Do not have sex, douche, or use tampons. These actions may increase your risk for infection and miscarriage.    Rest as directed. Do not exercise or do strenuous activities. These activities may cause  labor or miscarriage. Ask your healthcare provider what activities are okay to do.  When should I seek immediate care?    You feel weak or faint.    Your pain or cramping in your abdomen or back gets worse.    You have vaginal bleeding that soaks 1 or more pads in an hour.    You pass material that looks like tissue or large clots.  When should I call my doctor?    You have a fever.    You have trouble urinating, burning when you urinate, or feel a need to urinate often.    You have new or worsening vaginal bleeding.    You have vaginal pain or itching, or vaginal discharge that is yellow, green, or foul-smelling.    You have questions or concerns about your condition or care.  CARE AGREEMENT:    You have the right to help plan your care. Learn about your health condition and how it may be treated. Discuss treatment options with your healthcare providers to decide what care you want to receive. You always have the right to refuse treatment.    © [employer] 2023    PELVIC REST - AfterCare(R) Instructions(ER/ED)    Pelvic Rest    WHAT YOU NEED TO KNOW:    Pelvic rest is when you do not put anything in your vagina. This includes tampons, douching, or sexual activity. You may need to avoid lifting heavy objects. Your healthcare provider will tell you how long you need pelvic rest.    DISCHARGE INSTRUCTIONS:    Return to the emergency department if:    You have sudden, heavy bleeding from your vagina.    You have severe pain.  Contact your healthcare provider if:    You have discharge from your vagina that smells bad.    You have a fever.    You have questions or concerns about your condition or care.  Follow up with your doctor as directed: Write down your questions so you remember to ask them during your visits.    © [employer] 2023 Advance activity as tolerated.  Continue all previously prescribed medications as directed unless otherwise instructed.  Practice pelvic rest -- no sexual intercourse, no foreign bodies, no tampons in vagina.  Return to the emergency department or your OB/GYN in 2 days for repeat blood work (hcg).  Follow up with your primary care physician and OB/GYN (referral list provided or call [phone] to make an appointment with the OB/GYN clinic)  in 48-72 hours- bring copies of your results.  Return to the Emergency Department for worsening or persistent symptoms, including but not limited to worsening/persistent pain, heavy vaginal bleeding requiring more than or equal to 2 pads in 1 hour, lightheadedness, passing out, chest pain, shortness of breath, and/or ANY NEW OR CONCERNING SYMPTOMS. If you have issues obtaining follow up, please call: [phone] to obtain a doctor or specialist who takes your insurance in your area.  You may call [phone] to make an appointment with the internal medicine clinic.     THREATENED MISCARRIAGE - General Information    Threatened Miscarriage    WHAT YOU NEED TO KNOW:    What is a threatened miscarriage? A threatened miscarriage occurs when you have vaginal bleeding within the first 20 weeks of pregnancy. It means that a miscarriage may happen. A threatened miscarriage may also be called a threatened .    What causes bleeding or spotting during pregnancy? The cause of your bleeding or spotting may not be known. The following are possible causes of vaginal bleeding during pregnancy:    Polyps, fibroids, or cysts in the uterus    Sexual intercourse    Infection    Where or how the placenta is attached to your uterus (womb)    A problem with your fetus (unborn baby)    Drug or alcohol use    Ectopic pregnancy (the fetus is growing outside of the uterus)  What are the signs and symptoms of a threatened miscarriage?    Vaginal spotting or bleeding    Pain or cramping in your abdomen or lower back  How is a threatened miscarriage diagnosed? Tell your healthcare provider when your bleeding started. You may need any of the following:    Blood tests may show infection, check your level of pregnancy hormone, or give information about your overall health.    A pelvic exam checks the size of your uterus. A pelvic exam also checks your cervix for dilation (opening).    A pelvic ultrasound shows pictures of the fetus and finds his or her heartbeat. A pelvic ultrasound also looks at your reproductive organs and monitors the amount of bleeding.  How is a threatened miscarriage managed? The following may help you manage your symptoms and decrease your risk for a miscarriage:    Do not put anything in your vagina. Do not have sex, douche, or use tampons. These actions may increase your risk for infection and miscarriage.    Rest as directed. Do not exercise or do strenuous activities. These activities may cause  labor or miscarriage. Ask your healthcare provider what activities are okay to do.  When should I seek immediate care?    You feel weak or faint.    Your pain or cramping in your abdomen or back gets worse.    You have vaginal bleeding that soaks 1 or more pads in an hour.    You pass material that looks like tissue or large clots.  When should I call my doctor?    You have a fever.    You have trouble urinating, burning when you urinate, or feel a need to urinate often.    You have new or worsening vaginal bleeding.    You have vaginal pain or itching, or vaginal discharge that is yellow, green, or foul-smelling.    You have questions or concerns about your condition or care.  CARE AGREEMENT:    You have the right to help plan your care. Learn about your health condition and how it may be treated. Discuss treatment options with your healthcare providers to decide what care you want to receive. You always have the right to refuse treatment.    © [employer] 2023    PELVIC REST - AfterCare(R) Instructions(ER/ED)    Pelvic Rest    WHAT YOU NEED TO KNOW:    Pelvic rest is when you do not put anything in your vagina. This includes tampons, douching, or sexual activity. You may need to avoid lifting heavy objects. Your healthcare provider will tell you how long you need pelvic rest.    DISCHARGE INSTRUCTIONS:    Return to the emergency department if:    You have sudden, heavy bleeding from your vagina.    You have severe pain.  Contact your healthcare provider if:    You have discharge from your vagina that smells bad.    You have a fever.    You have questions or concerns about your condition or care.  Follow up with your doctor as directed: Write down your questions so you remember to ask them during your visits.    © [employer] 2023

## 2024-02-24 NOTE — CONSULT NOTE ADULT - SUBJECTIVE AND OBJECTIVE BOX
TOMAS SCOTT  38y  Female [id number]    HPI: 37yo  (LMP ) at 5w2d by LMP presenting with vaginal spotting and abdominal cramping since this AM. States she notices mild vaginal spotting when wiping. Has not taken any medication for abdominal cramping, but states it is manageable. Denies fevers/chills, n/v, CP, SOB, dysuria, changes in bowel habits. In the ED, bHCG 1298 and TVUS demonstrates anechoic round structure seen in the endometrium, which may represent an early gestational sac. GYN consulted for PUL.      Name of GYN Physician: Dr. Agrawal    ObHx:  - FT  x2, ectopic s/p MTX, TOP w/ D&C  GynHx: Denies fibroids, cysts, endometriosis, STI's, Abnormal pap smears   PMHx: Denies  SurgHx: D&C, anal fissurectomy x2  Meds: Denies  Allergies: PCN (anaphylaxis)  Social History:  Denies smoking use, drug use, alcohol use.    Vital Signs Last 24 Hrs  T(C): 36.6 (2024 20:32), Max: 36.9 (2024 16:56)  T(F): 97.9 (2024 20:32), Max: 98.4 (2024 16:56)  HR: 77 (2024 20:32) (77 - 100)  BP: 101/68 (2024 20:32) (101/68 - 114/73)  RR: 16 (2024 20:32) (16 - 16)  SpO2: 100% (2024 20:32) (100% - 100%)    Parameters below as of 2024 20:32  Patient On (Oxygen Delivery Method): room air        Physical Exam:   General: sitting comfortably in bed, NAD   HEENT: neck supple, full ROM  CV: clinically well perfused  Lungs: unlabored respirations  Back: No CVA tenderness  Abd: Soft, non-tender, non-distended, no rebound or guarding  :  No bleeding on pad  Ext: non-tender b/l, no edema     LABS:                          13.6   6.58  )-----------( 338      ( 2024 19:35 )             42.1         139  |  103  |  13  ----------------------------<  76  4.2   |  25  |  0.59    Ca    9.3      2024 19:35    TPro  8.0  /  Alb  4.6  /  TBili  0.3  /  DBili  x   /  AST  33<H>  /  ALT  18  /  AlkPhos  72        Urinalysis Basic - ( 2024 19:35 )    Color: Yellow / Appearance: Cloudy / S.027 / pH: x  Gluc: 76 mg/dL / Ketone: 15 mg/dL  / Bili: Negative / Urobili: 1.0 mg/dL   Blood: x / Protein: Trace mg/dL / Nitrite: Negative   Leuk Esterase: Negative / RBC: 11 /HPF / WBC 1 /HPF   Sq Epi: x / Non Sq Epi: 2 /HPF / Bacteria: Negative /HPF        RADIOLOGY & ADDITIONAL STUDIES:  < from: US Transvaginal, OB (24 @ 21:11) >  ACC: [id number] EXAM:  US OB TRANSVAGINAL   ORDERED BY: GENNARO UREÑA     PROCEDURE DATE:  2024          INTERPRETATION:  CLINICAL INFORMATION: Vaginal bleeding. Serum beta-hCG   of 1298.    LMP: 2024    Estimated Gestational Age by LMP: 5 weeks, 3 days    COMPARISON: None available.    Endovaginal pelvic sonogram only. Color and Spectral Doppler was   performed.    FINDINGS:  Uterus: 9.0 x 4.7 x 5.8 cm.  Anechoic round structure seen in the endometrium, which may represent   gestational sac.  Gestational Sac Size (mean): 0.3 cm  Gestational Sac Shape : Normal.  Estimated Gestational Age: 4 weeks, 5 days by mean gestational sac size.  Yolk Sac: Not present    Right ovary: 3.7 cm x 2.2 cm x 3.2 cm. Corpus luteum measuring 1.9 x2.0   x 1.9 cm. Normal arterial and venous waveforms.  Left ovary: 2.1 cm x 2.3 cm x 1.8 cm. Within normal limits. Normal   arterial and venous waveforms.    Fluid: Trace    IMPRESSION:  Anechoic round structure seen in the endometrium, which may represent an   early gestational sac. Recommend serial hCGs and follow-up ultrasound.  Estimated gestational age of 4 weeks, 5 days  Estimated due date of  10/23/2024        --- End of Report ---          DOUG WHALEY MD; Resident Radiologist  This document has been electronically signed.  JOSEPH FORD MD; Attending Radiologist  This document has been electronically signed. 2024  9:43PM    < end of copied text >

## 2024-02-24 NOTE — ED PROVIDER NOTE - OBJECTIVE STATEMENT
This is a 38-year-old female past medical history thyroid disorder  not on medication currently, hx of anemia complaint of vaginal spotting and bleeding, started couple of days ago, worsening today. LMP January 8. She did  couple of home pregnancy test with positive results. OBGYN appointment March 18. G5, P2 with 1 ectopic pregnancy and 1 D&C, history of uterine cyst, spontaneously resolved , no history of STIs. Denies fever chills shortness of breath, urinary symptoms diarrhea constipation.

## 2024-02-24 NOTE — ED ADULT TRIAGE NOTE - CHIEF COMPLAINT QUOTE
Pt is 6 wks pregnant, , LMP 24. C/o vaginal bleeding and pelvic cramping that started today. Phx Hyperthryoidism

## 2024-02-24 NOTE — ED PROVIDER NOTE - PROGRESS NOTE DETAILS
HEMA GUTIERREZ:  OB/GYN recommends discharge and repeat hcg in 48 hours.  Pt medically stable for discharge.  Strict return precautions given.  Pt to follow up with OB/GYN or ED in 48 hours for repeat hcg.

## 2024-02-24 NOTE — ED ADULT TRIAGE NOTE - STATUS:
----- Message from GEMINI Blackwood CNP sent at 1/27/2023  1:53 PM EST -----  Labs and CHF Clinic note reviewed  Patient of Dr. Fatuma Daley with ischemic cardiomyopathy  Vitals:  BP (!) 126/56   Pulse 70     Current GDMT:  Entresto 24/26 mg BID  Toprol 25 mg BID (increased OV 1/4/23)  Spironolactone 25 mg daily   Jardiance 10 mg daily (stop per PCP, 1/13/23)  Torsemide 20 mg PRN (changed to PRN 1/19/23)      Please have him increase Entresto to moderate dose   Follow up labs in week at scheduled CHF clinic appointment [employer]

## 2024-02-24 NOTE — ED PROVIDER NOTE - PATIENT PORTAL LINK FT
You can access the FollowMyHealth Patient Portal offered by [facility] by registering at the following website: [email or web address]. By joining [employer]’s FollowMyHealth portal, you will also be able to view your health information using other applications (apps) compatible with our system.

## 2024-02-24 NOTE — ED PROVIDER NOTE - CLINICAL SUMMARY MEDICAL DECISION MAKING FREE TEXT BOX
This is a 38-year-old female past medical history thyroid disorder  not on medication currently, hx of anemia complaint of vaginal spotting and bleeding, started couple of days ago, worsening today. LMP January 8. She did  couple of home pregnancy test with positive results. OBGYN appointment March 18. G5, P2 with 1 ectopic pregnancy and 1 D&C, history of uterine cyst, spontaneously resolved , no history of STIs. Denies fever chills shortness of breath, urinary symptoms diarrhea constipation.   labs, tvus, ob consult

## 2024-02-24 NOTE — CONSULT NOTE ADULT - ASSESSMENT
A/P: 37yo  (LMP ) at 5w2d by LMP presenting with PUL. Patient has mild vaginal spotting and abdominal cramping. bHCG 1298, H/H 13.6/42.1, TVUS demonstrates anechoic round structure seen in the endometrium, which may represent an early gestational sac. Differential includes early viable IUP vs. ectopic pregnancy vs. SAB in progress. Low concern for ectopic at this time given round structure seen in EM and benign abdominal exam. Patient is hemodynamically stable.    - Patient to follow up in 48 hours for repeat b-HCG with her OBGYN vs. the ED  - Rh type: O+.  No need for rhogam at this time.   - Ectopic precautions reviewed with patient. Discussed with patient importance of follow up for b-HCG given unknown pregnancy location at this time. Patient expressed understanding. All questions and concerns addressed to patient's apparent satisfaction.   - Patient stable for d/c home from GYN perspective. Primary management per ED team.      d/w Dr. Tyler Gardner, PGY-2

## 2024-02-24 NOTE — ED ADULT NURSE NOTE - NSFALLUNIVINTERV_ED_ALL_ED
Bed/Stretcher in lowest position, wheels locked, appropriate side rails in place/Call bell, personal items and telephone in reach/Instruct patient to call for assistance before getting out of bed/chair/stretcher/Non-slip footwear applied when patient is off stretcher/[place] to call system/Physically safe environment - no spills, clutter or unnecessary equipment/Purposeful proactive rounding/Room/bathroom lighting operational, light cord in reach

## 2024-02-25 LAB
CULTURE RESULTS: SIGNIFICANT CHANGE UP
SPECIMEN SOURCE: SIGNIFICANT CHANGE UP

## 2024-02-26 ENCOUNTER — APPOINTMENT (OUTPATIENT)
Dept: OBGYN | Facility: CLINIC | Age: 39
End: 2024-02-26
Payer: COMMERCIAL

## 2024-02-26 ENCOUNTER — NON-APPOINTMENT (OUTPATIENT)
Age: 39
End: 2024-02-26

## 2024-02-26 VITALS — SYSTOLIC BLOOD PRESSURE: 141 MMHG | DIASTOLIC BLOOD PRESSURE: 66 MMHG | HEIGHT: 62 IN

## 2024-02-26 PROCEDURE — 36415 COLL VENOUS BLD VENIPUNCTURE: CPT

## 2024-02-26 PROCEDURE — 99213 OFFICE O/P EST LOW 20 MIN: CPT | Mod: 25

## 2024-02-29 NOTE — PLAN
[FreeTextEntry1] : Pt. currently asymptomatic.  Will draw repeat betahcg and progesterone level.  Advised to repeat sono 3/6/24. Possibility of ectopic pregnancy could not be entirely excluded.

## 2024-03-03 ENCOUNTER — NON-APPOINTMENT (OUTPATIENT)
Age: 39
End: 2024-03-03

## 2024-03-04 ENCOUNTER — APPOINTMENT (OUTPATIENT)
Dept: OBGYN | Facility: CLINIC | Age: 39
End: 2024-03-04
Payer: COMMERCIAL

## 2024-03-04 VITALS
WEIGHT: 146 LBS | HEIGHT: 62 IN | BODY MASS INDEX: 26.87 KG/M2 | DIASTOLIC BLOOD PRESSURE: 60 MMHG | SYSTOLIC BLOOD PRESSURE: 112 MMHG

## 2024-03-04 DIAGNOSIS — Z01.411 ENCOUNTER FOR GYNECOLOGICAL EXAMINATION (GENERAL) (ROUTINE) WITH ABNORMAL FINDINGS: ICD-10-CM

## 2024-03-04 DIAGNOSIS — Z11.51 ENCOUNTER FOR SCREENING FOR HUMAN PAPILLOMAVIRUS (HPV): ICD-10-CM

## 2024-03-04 DIAGNOSIS — O21.9 VOMITING OF PREGNANCY, UNSPECIFIED: ICD-10-CM

## 2024-03-04 LAB
HCG SERPL-MCNC: 2700 MIU/ML
PROGEST SERPL-MCNC: 24.6 NG/ML

## 2024-03-04 PROCEDURE — 36415 COLL VENOUS BLD VENIPUNCTURE: CPT

## 2024-03-04 PROCEDURE — 99395 PREV VISIT EST AGE 18-39: CPT

## 2024-03-04 PROCEDURE — 76830 TRANSVAGINAL US NON-OB: CPT

## 2024-03-04 RX ORDER — DOXYLAMINE SUCCINATE AND PYRIDOXINE HYDROCHLORIDE 10; 10 MG/1; MG/1
10-10 TABLET, DELAYED RELEASE ORAL
Qty: 60 | Refills: 1 | Status: ACTIVE | COMMUNITY
Start: 2024-03-04 | End: 1900-01-01

## 2024-03-04 NOTE — PROCEDURE
[Transvaginal OB Sonogram] : Transvaginal OB Sonogram [Yolk Sac] : yolk sac present [Intrauterine Pregnancy] : intrauterine pregnancy [Fetal Heart] : fetal heart present [CRL: ___ (mm)] : CRL - [unfilled]Umm [Current GA by Sonogram: ___ (wks)] : Current GA by Sonogram: [unfilled]Uwks [___ day(s)] : [unfilled] days [FreeTextEntry1] : EDC by LMP: 10/24/24 EDC by sono: 10/26/24

## 2024-03-04 NOTE — PLAN
[FreeTextEntry1] : Prenatal care and talk Return x2 weeks for better dating and confirmed viability as threatened AB. Recommend diclegis.

## 2024-03-04 NOTE — PHYSICAL EXAM
[Awake] : awake [Alert] : alert [Soft] : soft [Oriented x3] : oriented to person, place, and time [Normal] : cervix [Uterine Adnexae] : were not tender and not enlarged [Acute Distress] : no acute distress [Mass] : no breast mass [Nipple Discharge] : no nipple discharge [Axillary LAD] : no axillary lymphadenopathy [Tender] : non tender

## 2024-03-04 NOTE — PHYSICAL EXAM
[Awake] : awake [Alert] : alert [Soft] : soft [Oriented x3] : oriented to person, place, and time [Normal] : uterus [Uterine Adnexae] : were not tender and not enlarged [Mass] : no breast mass [Acute Distress] : no acute distress [Nipple Discharge] : no nipple discharge [Axillary LAD] : no axillary lymphadenopathy [Tender] : non tender

## 2024-03-04 NOTE — PROCEDURE
[Transvaginal OB Sonogram] : Transvaginal OB Sonogram [Intrauterine Pregnancy] : intrauterine pregnancy [Yolk Sac] : yolk sac present [Fetal Heart] : fetal heart present [CRL: ___ (mm)] : CRL - [unfilled]Umm [Current GA by Sonogram: ___ (wks)] : Current GA by Sonogram: [unfilled]Uwks [___ day(s)] : [unfilled] days [FreeTextEntry1] : EDC by LMP: 10/24/24 EDC by sono: 10/26/24

## 2024-03-08 LAB
ABO + RH PNL BLD: NORMAL
B19V IGG SER QL IA: 3.17 INDEX
B19V IGG+IGM SER-IMP: NORMAL
B19V IGG+IGM SER-IMP: POSITIVE
B19V IGM FLD-ACNC: 0.14 INDEX
B19V IGM SER-ACNC: NEGATIVE
BASOPHILS # BLD AUTO: 0.02 K/UL
BASOPHILS NFR BLD AUTO: 0.3 %
BLD GP AB SCN SERPL QL: NORMAL
C TRACH RRNA SPEC QL NAA+PROBE: NOT DETECTED
EOSINOPHIL # BLD AUTO: 0.1 K/UL
EOSINOPHIL NFR BLD AUTO: 1.6 %
HBV SURFACE AG SER QL: NONREACTIVE
HCT VFR BLD CALC: 38.1 %
HCV AB SER QL: NONREACTIVE
HCV S/CO RATIO: 0.12 S/CO
HGB BLD-MCNC: 12.5 G/DL
HIV1+2 AB SPEC QL IA.RAPID: NONREACTIVE
HPV HIGH+LOW RISK DNA PNL CVX: NOT DETECTED
IMM GRANULOCYTES NFR BLD AUTO: 0.3 %
LYMPHOCYTES # BLD AUTO: 1.9 K/UL
LYMPHOCYTES NFR BLD AUTO: 29.5 %
MAN DIFF?: NORMAL
MCHC RBC-ENTMCNC: 28.7 PG
MCHC RBC-ENTMCNC: 32.8 GM/DL
MCV RBC AUTO: 87.6 FL
MEV IGG FLD QL IA: 42.8 AU/ML
MEV IGG+IGM SER-IMP: POSITIVE
MONOCYTES # BLD AUTO: 0.42 K/UL
MONOCYTES NFR BLD AUTO: 6.5 %
N GONORRHOEA RRNA SPEC QL NAA+PROBE: NOT DETECTED
NEUTROPHILS # BLD AUTO: 3.98 K/UL
NEUTROPHILS NFR BLD AUTO: 61.8 %
PLATELET # BLD AUTO: 307 K/UL
RBC # BLD: 4.35 M/UL
RBC # FLD: 13.4 %
SOURCE TP AMPLIFICATION: NORMAL
T GONDII AB SER-IMP: NEGATIVE
T GONDII IGG SER QL: <3 IU/ML
T PALLIDUM AB SER QL IA: NEGATIVE
TSH SERPL-ACNC: 1.11 UIU/ML
WBC # FLD AUTO: 6.44 K/UL

## 2024-03-11 ENCOUNTER — NON-APPOINTMENT (OUTPATIENT)
Age: 39
End: 2024-03-11

## 2024-03-11 LAB — CYTOLOGY CVX/VAG DOC THIN PREP: ABNORMAL

## 2024-03-11 RX ORDER — METOCLOPRAMIDE 10 MG/1
10 TABLET ORAL 4 TIMES DAILY
Qty: 56 | Refills: 0 | Status: ACTIVE | COMMUNITY
Start: 2024-03-11 | End: 1900-01-01

## 2024-03-13 ENCOUNTER — EMERGENCY (EMERGENCY)
Facility: HOSPITAL | Age: 39
LOS: 1 days | Discharge: HOME | End: 2024-03-13
Admitting: STUDENT IN AN ORGANIZED HEALTH CARE EDUCATION/TRAINING PROGRAM
Payer: COMMERCIAL

## 2024-03-13 VITALS
HEART RATE: 72 BPM | DIASTOLIC BLOOD PRESSURE: 70 MMHG | SYSTOLIC BLOOD PRESSURE: 108 MMHG | OXYGEN SATURATION: 100 % | TEMPERATURE: 98 F | RESPIRATION RATE: 18 BRPM

## 2024-03-13 VITALS
DIASTOLIC BLOOD PRESSURE: 64 MMHG | SYSTOLIC BLOOD PRESSURE: 96 MMHG | HEART RATE: 72 BPM | TEMPERATURE: 99 F | RESPIRATION RATE: 17 BRPM | OXYGEN SATURATION: 100 %

## 2024-03-13 DIAGNOSIS — Z98.890 OTHER SPECIFIED POSTPROCEDURAL STATES: Chronic | ICD-10-CM

## 2024-03-13 LAB
ALBUMIN SERPL ELPH-MCNC: 4.3 G/DL — SIGNIFICANT CHANGE UP (ref 3.3–5)
ALP SERPL-CCNC: 70 U/L — SIGNIFICANT CHANGE UP (ref 40–120)
ALT FLD-CCNC: 16 U/L — SIGNIFICANT CHANGE UP (ref 4–33)
ANION GAP SERPL CALC-SCNC: 13 MMOL/L — SIGNIFICANT CHANGE UP (ref 7–14)
APPEARANCE UR: CLEAR — SIGNIFICANT CHANGE UP
AST SERPL-CCNC: 19 U/L — SIGNIFICANT CHANGE UP (ref 4–32)
BACTERIA # UR AUTO: NEGATIVE /HPF — SIGNIFICANT CHANGE UP
BASOPHILS # BLD AUTO: 0.04 K/UL — SIGNIFICANT CHANGE UP (ref 0–0.2)
BASOPHILS NFR BLD AUTO: 0.4 % — SIGNIFICANT CHANGE UP (ref 0–2)
BILIRUB SERPL-MCNC: 0.2 MG/DL — SIGNIFICANT CHANGE UP (ref 0.2–1.2)
BILIRUB UR-MCNC: NEGATIVE — SIGNIFICANT CHANGE UP
BLD GP AB SCN SERPL QL: NEGATIVE — SIGNIFICANT CHANGE UP
BUN SERPL-MCNC: 8 MG/DL — SIGNIFICANT CHANGE UP (ref 7–23)
CALCIUM SERPL-MCNC: 9.3 MG/DL — SIGNIFICANT CHANGE UP (ref 8.4–10.5)
CAST: 0 /LPF — SIGNIFICANT CHANGE UP (ref 0–4)
CHLORIDE SERPL-SCNC: 101 MMOL/L — SIGNIFICANT CHANGE UP (ref 98–107)
CO2 SERPL-SCNC: 23 MMOL/L — SIGNIFICANT CHANGE UP (ref 22–31)
COLOR SPEC: YELLOW — SIGNIFICANT CHANGE UP
CREAT SERPL-MCNC: 0.59 MG/DL — SIGNIFICANT CHANGE UP (ref 0.5–1.3)
DIFF PNL FLD: NEGATIVE — SIGNIFICANT CHANGE UP
EGFR: 120 ML/MIN/1.73M2 — SIGNIFICANT CHANGE UP
EOSINOPHIL # BLD AUTO: 0.15 K/UL — SIGNIFICANT CHANGE UP (ref 0–0.5)
EOSINOPHIL NFR BLD AUTO: 1.5 % — SIGNIFICANT CHANGE UP (ref 0–6)
GLUCOSE SERPL-MCNC: 85 MG/DL — SIGNIFICANT CHANGE UP (ref 70–99)
GLUCOSE UR QL: NEGATIVE MG/DL — SIGNIFICANT CHANGE UP
HCG SERPL-ACNC: SIGNIFICANT CHANGE UP MIU/ML
HCT VFR BLD CALC: 39.1 % — SIGNIFICANT CHANGE UP (ref 34.5–45)
HGB BLD-MCNC: 13 G/DL — SIGNIFICANT CHANGE UP (ref 11.5–15.5)
IANC: 7.15 K/UL — SIGNIFICANT CHANGE UP (ref 1.8–7.4)
IMM GRANULOCYTES NFR BLD AUTO: 0.4 % — SIGNIFICANT CHANGE UP (ref 0–0.9)
KETONES UR-MCNC: NEGATIVE MG/DL — SIGNIFICANT CHANGE UP
LEUKOCYTE ESTERASE UR-ACNC: NEGATIVE — SIGNIFICANT CHANGE UP
LYMPHOCYTES # BLD AUTO: 2.09 K/UL — SIGNIFICANT CHANGE UP (ref 1–3.3)
LYMPHOCYTES # BLD AUTO: 20.7 % — SIGNIFICANT CHANGE UP (ref 13–44)
MAGNESIUM SERPL-MCNC: 2.2 MG/DL — SIGNIFICANT CHANGE UP (ref 1.6–2.6)
MCHC RBC-ENTMCNC: 28.3 PG — SIGNIFICANT CHANGE UP (ref 27–34)
MCHC RBC-ENTMCNC: 33.2 GM/DL — SIGNIFICANT CHANGE UP (ref 32–36)
MCV RBC AUTO: 85.2 FL — SIGNIFICANT CHANGE UP (ref 80–100)
MONOCYTES # BLD AUTO: 0.63 K/UL — SIGNIFICANT CHANGE UP (ref 0–0.9)
MONOCYTES NFR BLD AUTO: 6.2 % — SIGNIFICANT CHANGE UP (ref 2–14)
NEUTROPHILS # BLD AUTO: 7.15 K/UL — SIGNIFICANT CHANGE UP (ref 1.8–7.4)
NEUTROPHILS NFR BLD AUTO: 70.8 % — SIGNIFICANT CHANGE UP (ref 43–77)
NITRITE UR-MCNC: NEGATIVE — SIGNIFICANT CHANGE UP
NRBC # BLD: 0 /100 WBCS — SIGNIFICANT CHANGE UP (ref 0–0)
NRBC # FLD: 0 K/UL — SIGNIFICANT CHANGE UP (ref 0–0)
PH UR: 6.5 — SIGNIFICANT CHANGE UP (ref 5–8)
PHOSPHATE SERPL-MCNC: 3.3 MG/DL — SIGNIFICANT CHANGE UP (ref 2.5–4.5)
PLATELET # BLD AUTO: 315 K/UL — SIGNIFICANT CHANGE UP (ref 150–400)
POTASSIUM SERPL-MCNC: 3.9 MMOL/L — SIGNIFICANT CHANGE UP (ref 3.5–5.3)
POTASSIUM SERPL-SCNC: 3.9 MMOL/L — SIGNIFICANT CHANGE UP (ref 3.5–5.3)
PROT SERPL-MCNC: 8.4 G/DL — HIGH (ref 6–8.3)
PROT UR-MCNC: NEGATIVE MG/DL — SIGNIFICANT CHANGE UP
RBC # BLD: 4.59 M/UL — SIGNIFICANT CHANGE UP (ref 3.8–5.2)
RBC # FLD: 12.8 % — SIGNIFICANT CHANGE UP (ref 10.3–14.5)
RBC CASTS # UR COMP ASSIST: 2 /HPF — SIGNIFICANT CHANGE UP (ref 0–4)
RH IG SCN BLD-IMP: POSITIVE — SIGNIFICANT CHANGE UP
SODIUM SERPL-SCNC: 137 MMOL/L — SIGNIFICANT CHANGE UP (ref 135–145)
SP GR SPEC: 1.02 — SIGNIFICANT CHANGE UP (ref 1–1.03)
SQUAMOUS # UR AUTO: 1 /HPF — SIGNIFICANT CHANGE UP (ref 0–5)
UROBILINOGEN FLD QL: 1 MG/DL — SIGNIFICANT CHANGE UP (ref 0.2–1)
WBC # BLD: 10.1 K/UL — SIGNIFICANT CHANGE UP (ref 3.8–10.5)
WBC # FLD AUTO: 10.1 K/UL — SIGNIFICANT CHANGE UP (ref 3.8–10.5)
WBC UR QL: 0 /HPF — SIGNIFICANT CHANGE UP (ref 0–5)

## 2024-03-13 PROCEDURE — 99284 EMERGENCY DEPT VISIT MOD MDM: CPT

## 2024-03-13 PROCEDURE — 76817 TRANSVAGINAL US OBSTETRIC: CPT | Mod: 26

## 2024-03-13 RX ORDER — ONDANSETRON 8 MG/1
1 TABLET, FILM COATED ORAL
Qty: 12 | Refills: 0
Start: 2024-03-13 | End: 2024-03-16

## 2024-03-13 RX ORDER — ONDANSETRON 8 MG/1
4 TABLET, FILM COATED ORAL ONCE
Refills: 0 | Status: COMPLETED | OUTPATIENT
Start: 2024-03-13 | End: 2024-03-13

## 2024-03-13 RX ORDER — SODIUM CHLORIDE 9 MG/ML
1000 INJECTION INTRAMUSCULAR; INTRAVENOUS; SUBCUTANEOUS ONCE
Refills: 0 | Status: COMPLETED | OUTPATIENT
Start: 2024-03-13 | End: 2024-03-13

## 2024-03-13 RX ADMIN — ONDANSETRON 4 MILLIGRAM(S): 8 TABLET, FILM COATED ORAL at 20:21

## 2024-03-13 RX ADMIN — SODIUM CHLORIDE 1000 MILLILITER(S): 9 INJECTION INTRAMUSCULAR; INTRAVENOUS; SUBCUTANEOUS at 20:22

## 2024-03-13 NOTE — ED PROVIDER NOTE - OBJECTIVE STATEMENT
This is a 38-year-old female with a past medical history of thyroid disorder currently 8 weeks pregnant.  LMP was January 8 presented to the ED complaining having nausea and vomiting for the past several days.  She says she attempted to eat and drink in the morning however vomited up everything that she attempted to eat.  She states that she called her PMD who recommended to come to the ED.  To get IV fluids.  She denies having any fever chills body aches headaches dizziness she states that she started having some vaginal bleeding since her pregnancy started.  Denies having any lightheadedness or dizziness.  She states that in her previous pregnancies she has had similar symptoms.

## 2024-03-13 NOTE — ED ADULT NURSE NOTE - BIRTH SEX
Female 28 y/o male with unilateral inguinal hernia, without obstruction, without gangrene, not specified as recurrent.

## 2024-03-13 NOTE — ED PROVIDER NOTE - NSFOLLOWUPINSTRUCTIONS_ED_ALL_ED_FT
Rest, drink plenty of fluids.  Advance activity as tolerated.  Continue all previously prescribed medications as directed.  Follow up with your primary care physician in 48-72 hours- bring copies of your results.  Return to the ER for worsening or persistent symptoms, and/or ANY NEW OR CONCERNING SYMPTOMS. If you have issues obtaining follow up, please call: [phone] to obtain a doctor or specialist who takes your insurance in your area.  You may call [phone] to make an appointment with the internal medicine clinic.

## 2024-03-13 NOTE — ED PROVIDER NOTE - CLINICAL SUMMARY MEDICAL DECISION MAKING FREE TEXT BOX
This is a 38-year-old female with a past medical history of thyroid disorder currently 8 weeks pregnant.  LMP was January 8 presented to the ED complaining having nausea and vomiting for the past several days.  Hyperemesis gravidarum will do labs, fluids and meds, Patient able to tolerate PO in ED at this time, will d/c and recommend f/u with OBGYN.

## 2024-03-13 NOTE — ED ADULT NURSE NOTE - NSFALLUNIVINTERV_ED_ALL_ED
Bed/Stretcher in lowest position, wheels locked, appropriate side rails in place/Call bell, personal items and telephone in reach/Instruct patient to call for assistance before getting out of bed/chair/stretcher/Non-slip footwear applied when patient is off stretcher/[place] to call system/Physically safe environment - no spills, clutter or unnecessary equipment/Purposeful proactive rounding/Room/bathroom lighting operational, light cord in reach

## 2024-03-13 NOTE — ED PROVIDER NOTE - PATIENT PORTAL LINK FT
You can access the FollowMyHealth Patient Portal offered by [facility] by registering at the following website: [email or web address]. By joining [employer]’s FollowMyHealth portal, you will also be able to view your health information using other applications (apps) compatible with our system.

## 2024-03-13 NOTE — ED ADULT NURSE NOTE - OBJECTIVE STATEMENT
Pt is alert and orientedx4, ambulatory at baseline. Pt presents to the ED with hyperemesis and vaginal spotting, pt is 8 weeks pregnant. Pt states she has had mild vaginal spotting since the beginning of her pregnancy and her OBGYN is aware. Pt also has had hyperemesis with her last 2 pregnancies. Pt denies chest pain, shortness of breath, dyspnea on exertion, breathing is unlabored and even. 20G IV placed in LAC, labs drawn, awaiting further orders. Call bell within reach, bed in lowest position, will continue to monitor.

## 2024-03-15 ENCOUNTER — NON-APPOINTMENT (OUTPATIENT)
Age: 39
End: 2024-03-15

## 2024-03-18 ENCOUNTER — EMERGENCY (EMERGENCY)
Facility: HOSPITAL | Age: 39
LOS: 0 days | Discharge: HOME | End: 2024-03-18
Attending: EMERGENCY MEDICINE
Payer: COMMERCIAL

## 2024-03-18 VITALS
SYSTOLIC BLOOD PRESSURE: 102 MMHG | DIASTOLIC BLOOD PRESSURE: 69 MMHG | OXYGEN SATURATION: 100 % | TEMPERATURE: 98 F | HEART RATE: 68 BPM | RESPIRATION RATE: 19 BRPM

## 2024-03-18 VITALS
HEART RATE: 74 BPM | TEMPERATURE: 98 F | OXYGEN SATURATION: 100 % | RESPIRATION RATE: 18 BRPM | SYSTOLIC BLOOD PRESSURE: 102 MMHG | DIASTOLIC BLOOD PRESSURE: 62 MMHG

## 2024-03-18 DIAGNOSIS — Z98.890 OTHER SPECIFIED POSTPROCEDURAL STATES: Chronic | ICD-10-CM

## 2024-03-18 DIAGNOSIS — O99.411 DISEASES OF THE CIRCULATORY SYSTEM COMPLICATING PREGNANCY, FIRST TRIMESTER: ICD-10-CM

## 2024-03-18 DIAGNOSIS — O20.9 HEMORRHAGE IN EARLY PREGNANCY, UNSPECIFIED: ICD-10-CM

## 2024-03-18 DIAGNOSIS — I95.9 HYPOTENSION, UNSPECIFIED: ICD-10-CM

## 2024-03-18 DIAGNOSIS — Z3A.08 8 WEEKS GESTATION OF PREGNANCY: ICD-10-CM

## 2024-03-18 DIAGNOSIS — Z88.0 ALLERGY STATUS TO PENICILLIN: ICD-10-CM

## 2024-03-18 DIAGNOSIS — O26.851 SPOTTING COMPLICATING PREGNANCY, FIRST TRIMESTER: ICD-10-CM

## 2024-03-18 LAB
ALBUMIN SERPL ELPH-MCNC: 3.6 G/DL — SIGNIFICANT CHANGE UP (ref 3.3–5)
ALP SERPL-CCNC: 65 U/L — SIGNIFICANT CHANGE UP (ref 40–120)
ALT FLD-CCNC: 22 U/L — SIGNIFICANT CHANGE UP (ref 12–78)
ANION GAP SERPL CALC-SCNC: 8 MMOL/L — SIGNIFICANT CHANGE UP (ref 5–17)
APPEARANCE UR: CLEAR — SIGNIFICANT CHANGE UP
AST SERPL-CCNC: 20 U/L — SIGNIFICANT CHANGE UP (ref 15–37)
BACTERIA # UR AUTO: NEGATIVE /HPF — SIGNIFICANT CHANGE UP
BASOPHILS # BLD AUTO: 0.04 K/UL — SIGNIFICANT CHANGE UP (ref 0–0.2)
BASOPHILS NFR BLD AUTO: 0.4 % — SIGNIFICANT CHANGE UP (ref 0–2)
BILIRUB SERPL-MCNC: 0.5 MG/DL — SIGNIFICANT CHANGE UP (ref 0.2–1.2)
BILIRUB UR-MCNC: NEGATIVE — SIGNIFICANT CHANGE UP
BLD GP AB SCN SERPL QL: SIGNIFICANT CHANGE UP
BUN SERPL-MCNC: 10 MG/DL — SIGNIFICANT CHANGE UP (ref 7–23)
CALCIUM SERPL-MCNC: 9.3 MG/DL — SIGNIFICANT CHANGE UP (ref 8.5–10.1)
CAST: 1 /LPF — SIGNIFICANT CHANGE UP (ref 0–4)
CHLORIDE SERPL-SCNC: 104 MMOL/L — SIGNIFICANT CHANGE UP (ref 96–108)
CO2 SERPL-SCNC: 25 MMOL/L — SIGNIFICANT CHANGE UP (ref 22–31)
COLOR SPEC: SIGNIFICANT CHANGE UP
CREAT SERPL-MCNC: 0.73 MG/DL — SIGNIFICANT CHANGE UP (ref 0.5–1.3)
DIFF PNL FLD: NEGATIVE — SIGNIFICANT CHANGE UP
EGFR: 109 ML/MIN/1.73M2 — SIGNIFICANT CHANGE UP
EOSINOPHIL # BLD AUTO: 0.11 K/UL — SIGNIFICANT CHANGE UP (ref 0–0.5)
EOSINOPHIL NFR BLD AUTO: 1 % — SIGNIFICANT CHANGE UP (ref 0–6)
GLUCOSE SERPL-MCNC: 93 MG/DL — SIGNIFICANT CHANGE UP (ref 70–99)
GLUCOSE UR QL: NEGATIVE MG/DL — SIGNIFICANT CHANGE UP
HCG SERPL-ACNC: HIGH MIU/ML
HCT VFR BLD CALC: 41.4 % — SIGNIFICANT CHANGE UP (ref 34.5–45)
HGB BLD-MCNC: 13.9 G/DL — SIGNIFICANT CHANGE UP (ref 11.5–15.5)
IMM GRANULOCYTES NFR BLD AUTO: 0.3 % — SIGNIFICANT CHANGE UP (ref 0–0.9)
KETONES UR-MCNC: 15 MG/DL
LEUKOCYTE ESTERASE UR-ACNC: ABNORMAL
LYMPHOCYTES # BLD AUTO: 1.56 K/UL — SIGNIFICANT CHANGE UP (ref 1–3.3)
LYMPHOCYTES # BLD AUTO: 14.4 % — SIGNIFICANT CHANGE UP (ref 13–44)
MCHC RBC-ENTMCNC: 28.7 PG — SIGNIFICANT CHANGE UP (ref 27–34)
MCHC RBC-ENTMCNC: 33.6 GM/DL — SIGNIFICANT CHANGE UP (ref 32–36)
MCV RBC AUTO: 85.5 FL — SIGNIFICANT CHANGE UP (ref 80–100)
MONOCYTES # BLD AUTO: 0.55 K/UL — SIGNIFICANT CHANGE UP (ref 0–0.9)
MONOCYTES NFR BLD AUTO: 5.1 % — SIGNIFICANT CHANGE UP (ref 2–14)
NEUTROPHILS # BLD AUTO: 8.52 K/UL — HIGH (ref 1.8–7.4)
NEUTROPHILS NFR BLD AUTO: 78.8 % — HIGH (ref 43–77)
NITRITE UR-MCNC: NEGATIVE — SIGNIFICANT CHANGE UP
PH UR: 7.5 — SIGNIFICANT CHANGE UP (ref 5–8)
PLATELET # BLD AUTO: 313 K/UL — SIGNIFICANT CHANGE UP (ref 150–400)
POTASSIUM SERPL-MCNC: 3.8 MMOL/L — SIGNIFICANT CHANGE UP (ref 3.5–5.3)
POTASSIUM SERPL-SCNC: 3.8 MMOL/L — SIGNIFICANT CHANGE UP (ref 3.5–5.3)
PROT SERPL-MCNC: 8.2 GM/DL — SIGNIFICANT CHANGE UP (ref 6–8.3)
PROT UR-MCNC: 30 MG/DL
RBC # BLD: 4.84 M/UL — SIGNIFICANT CHANGE UP (ref 3.8–5.2)
RBC # FLD: 12.7 % — SIGNIFICANT CHANGE UP (ref 10.3–14.5)
RBC CASTS # UR COMP ASSIST: 5 /HPF — HIGH (ref 0–4)
SODIUM SERPL-SCNC: 137 MMOL/L — SIGNIFICANT CHANGE UP (ref 135–145)
SP GR SPEC: 1.03 — SIGNIFICANT CHANGE UP (ref 1–1.03)
SQUAMOUS # UR AUTO: 6 /HPF — HIGH (ref 0–5)
UROBILINOGEN FLD QL: 1 MG/DL — SIGNIFICANT CHANGE UP (ref 0.2–1)
WBC # BLD: 10.81 K/UL — HIGH (ref 3.8–10.5)
WBC # FLD AUTO: 10.81 K/UL — HIGH (ref 3.8–10.5)
WBC UR QL: 4 /HPF — SIGNIFICANT CHANGE UP (ref 0–5)

## 2024-03-18 PROCEDURE — 84702 CHORIONIC GONADOTROPIN TEST: CPT

## 2024-03-18 PROCEDURE — 36415 COLL VENOUS BLD VENIPUNCTURE: CPT

## 2024-03-18 PROCEDURE — 80053 COMPREHEN METABOLIC PANEL: CPT

## 2024-03-18 PROCEDURE — 87086 URINE CULTURE/COLONY COUNT: CPT

## 2024-03-18 PROCEDURE — 96375 TX/PRO/DX INJ NEW DRUG ADDON: CPT

## 2024-03-18 PROCEDURE — 86900 BLOOD TYPING SEROLOGIC ABO: CPT

## 2024-03-18 PROCEDURE — 99285 EMERGENCY DEPT VISIT HI MDM: CPT

## 2024-03-18 PROCEDURE — 76801 OB US < 14 WKS SINGLE FETUS: CPT

## 2024-03-18 PROCEDURE — 96376 TX/PRO/DX INJ SAME DRUG ADON: CPT

## 2024-03-18 PROCEDURE — 81001 URINALYSIS AUTO W/SCOPE: CPT

## 2024-03-18 PROCEDURE — 99284 EMERGENCY DEPT VISIT MOD MDM: CPT | Mod: 25

## 2024-03-18 PROCEDURE — 86850 RBC ANTIBODY SCREEN: CPT

## 2024-03-18 PROCEDURE — 96374 THER/PROPH/DIAG INJ IV PUSH: CPT

## 2024-03-18 PROCEDURE — 76801 OB US < 14 WKS SINGLE FETUS: CPT | Mod: 26

## 2024-03-18 PROCEDURE — 85025 COMPLETE CBC W/AUTO DIFF WBC: CPT

## 2024-03-18 PROCEDURE — 86901 BLOOD TYPING SEROLOGIC RH(D): CPT

## 2024-03-18 RX ORDER — DIPHENHYDRAMINE HCL 50 MG
25 CAPSULE ORAL ONCE
Refills: 0 | Status: COMPLETED | OUTPATIENT
Start: 2024-03-18 | End: 2024-03-18

## 2024-03-18 RX ORDER — ONDANSETRON 8 MG/1
4 TABLET, FILM COATED ORAL ONCE
Refills: 0 | Status: COMPLETED | OUTPATIENT
Start: 2024-03-18 | End: 2024-03-18

## 2024-03-18 RX ORDER — SODIUM CHLORIDE 9 MG/ML
1000 INJECTION INTRAMUSCULAR; INTRAVENOUS; SUBCUTANEOUS ONCE
Refills: 0 | Status: COMPLETED | OUTPATIENT
Start: 2024-03-18 | End: 2024-03-18

## 2024-03-18 RX ORDER — ONDANSETRON 8 MG/1
1 TABLET, FILM COATED ORAL
Qty: 15 | Refills: 0
Start: 2024-03-18 | End: 2024-03-22

## 2024-03-18 RX ORDER — FAMOTIDINE 10 MG/ML
20 INJECTION INTRAVENOUS ONCE
Refills: 0 | Status: COMPLETED | OUTPATIENT
Start: 2024-03-18 | End: 2024-03-18

## 2024-03-18 RX ADMIN — FAMOTIDINE 20 MILLIGRAM(S): 10 INJECTION INTRAVENOUS at 16:46

## 2024-03-18 RX ADMIN — ONDANSETRON 4 MILLIGRAM(S): 8 TABLET, FILM COATED ORAL at 16:19

## 2024-03-18 RX ADMIN — SODIUM CHLORIDE 1000 MILLILITER(S): 9 INJECTION INTRAMUSCULAR; INTRAVENOUS; SUBCUTANEOUS at 16:03

## 2024-03-18 RX ADMIN — ONDANSETRON 4 MILLIGRAM(S): 8 TABLET, FILM COATED ORAL at 12:54

## 2024-03-18 RX ADMIN — Medication 25 MILLIGRAM(S): at 17:33

## 2024-03-18 RX ADMIN — SODIUM CHLORIDE 1000 MILLILITER(S): 9 INJECTION INTRAMUSCULAR; INTRAVENOUS; SUBCUTANEOUS at 15:03

## 2024-03-18 RX ADMIN — SODIUM CHLORIDE 1000 MILLILITER(S): 9 INJECTION INTRAMUSCULAR; INTRAVENOUS; SUBCUTANEOUS at 12:48

## 2024-03-18 NOTE — ED STATDOCS - PATIENT PORTAL LINK FT
You can access the FollowMyHealth Patient Portal offered by [facility] by registering at the following website: [email or web address]. By joining [employer]’s FollowMyHealth portal, you will also be able to view your health information using other applications (apps) compatible with our system. You can access the FollowMyHealth Patient Portal offered by [facility] by registering at the following website: [email or web address]. By joining [employer]’s FollowMyHealth portal, you will also be able to view your health information using other applications (apps) compatible with our system.

## 2024-03-18 NOTE — ED STATDOCS - NS_ ATTENDINGSCRIBEDETAILS _ED_A_ED_FT
I, David Mena MD,  performed the initial face to face bedside interview with this patient regarding history of present illness, review of symptoms and relevant past medical, social and family history.  I completed an independent physical examination.  I was the initial provider who evaluated this patient.   I personally saw the patient and performed a substantive portion of the visit including all aspects of the medical decision making.  The history, relevant review of systems, past medical and surgical history, medical decision making, and physical examination was documented by the scribe in my presence and I attest to the accuracy of the documentation.

## 2024-03-18 NOTE — ED STATDOCS - PHYSICAL EXAMINATION
Constitutional: NAD AAOx3  Eyes: PERRLA EOMI  Head: Normocephalic atraumatic  Mouth: MMM  Cardiac: regular rate   Resp: unlabored breathing  GI: Abd s/nt/nd  Neuro: grossly normal and intact  Skin: No visible rashes Constitutional: mild distress AAOx3  Eyes: PERRLA EOMI  Head: Normocephalic atraumatic  Mouth: MMM  Cardiac: regular rate   Resp: unlabored breathing clear b/l   GI: Abd s/nt/nd  Neuro: grossly normal and intact  Skin: No visible rashes

## 2024-03-18 NOTE — ED STATDOCS - PROGRESS NOTE DETAILS
37 yo female A2, currently about 8 weeks pregnant presents with n/v since last night. Pt has vomiting at 5 weeks during this pregnancy and was given Diclegis and Reglan but had bad side effects during those medications. Pt was seen at [facility] On wednesday and was told that she hd a subchorionic hematoma and still has been having some spotting. Denies fever, diarrhea, urinary complaints.   Will check labs, sono, IVF, meds and OB consult. -Kash Dorantes PA-C   OB- Dr Vizcarra at [facility] Patient reevaluated at bedside.  Patient currently sleeping.  Discussed results with her  who is aware that we are pending sono results.  Once sono has resulted we will reevaluate patient and give p.o. trial. -Kash Dorantes PA-C Pt feeling better. Will give PO and likely d/c home. Pt has a few zofran left and would like another rx for zofran. -Kash Dorantes PA-C Pt tolerating PO in ED. Pt states she feels winsome and safe to go home and has a f/u with her OB tomorrow. Strict return precautions given. -Kash Dorantes PA-C Pt was slightly hypotensive prior to dc. Another L of IVF was given but still slightly hypotensive. Will give another L and consult OB. -Kash Dorantes PA-C Pt feels better. Has been eating crackers and ginger ale. No vomiting after medication was given. BP improved to >100 systolic. Will give one dose of benadryl first and d/c home. Pt aware and wants to go home. -Kash Dorantes PA-C

## 2024-03-18 NOTE — ED ADULT NURSE NOTE - OBJECTIVE STATEMENT
pt presents to the ED c/o nausea, vomiting, and dizziness. pt is 8 weeks pregnant and reports her MD has given her multiple medications for these symptoms without relief. pt A&Ox4, well appearing, and ambulatory at baseline with no further complaints or discomforts reported at this time.

## 2024-03-18 NOTE — ED STATDOCS - NSFOLLOWUPINSTRUCTIONS_ED_ALL_ED_FT
Hyperemesis Gravidarum  Hyperemesis gravidarum is a severe form of nausea and vomiting that happens during pregnancy. Hyperemesis is worse than morning sickness. It may cause you to have nausea or vomiting all day for many days. It may keep you from eating and drinking enough food and liquids, which can lead to dehydration, malnutrition, and weight loss. Hyperemesis usually occurs during the first half (the first 20 weeks) of pregnancy. It often goes away once a woman is in her second half of pregnancy. However, sometimes hyperemesis continues through an entire pregnancy.    What are the causes?  The cause of this condition is not known. It may be associated with:  Changes in hormones in the body during pregnancy.  Changes in the gastrointestinal system.  Genetic or inherited conditions.  What are the signs or symptoms?  Symptoms of this condition include:  Severe nausea and vomiting that does not go away.  Problems keeping food down.  Weight loss.  Loss of body fluid (dehydration).  Loss of appetite. You may have no desire to eat or you may not like the food you have previously enjoyed.  How is this diagnosed?  This condition may be diagnosed based on your medical history, your symptoms, and a physical exam.    You may also have other tests, including:  Blood tests.  Urine tests.  Blood pressure tests.  Ultrasound to look for problems with the placenta or to check if you are pregnant with more than one baby.  How is this treated?  This condition is managed by controlling symptoms. This may include:  Following an eating plan. This can help to lessen nausea and vomiting.  Treatments that do not use medicine. These include acupressure bracelets, hypnosis, and eating or drinking foods or fluids that contain ginger, ginger ale, or ginger tea.  Taking prescription medicine or over-the-counter medicine as told by your health care provider.  Continuing to take prenatal vitamins. You may need to change what kind you take and when you take them. Follow your health care provider's instructions about prenatal vitamins.  An eating plan and medicines are often used together to help control symptoms. If medicines do not help relieve nausea and vomiting, you may need to receive fluids through an IV at the hospital.    Follow these instructions at home:  To help relieve your symptoms, listen to your body. Everyone is different and has different preferences. Find what works best for you. Here are some things you can try to help relieve your symptoms:    Meals and snacks      Eat 5–6 small meals daily instead of 3 large meals. Eating small meals and snacks can help you avoid an empty stomach.  Before getting out of bed, eat a couple of crackers to avoid moving around on an empty stomach.  Eat a protein-rich snack before bed. Examples include cheese and crackers, or a peanut butter sandwich made with 1 slice of whole-wheat bread and 1 tsp (5 g) of peanut butter.  Eat and drink slowly.  Try eating starchy foods as these are usually tolerated well. Examples include cereal, toast, bread, potatoes, pasta, rice, and pretzels.  Eat at least one serving of protein with your meals and snacks. Protein options include lean meats, poultry, seafood, beans, nuts, nut butters, eggs, cheese, and yogurt.  Eat or suck on things that have ginger in them. It may help to relieve nausea. Add ¼ tsp (0.44 g) ground ginger to hot tea, or choose ginger tea.  Fluids    It is important to stay hydrated. Try to:  Drink small amounts of fluids often.  Drink fluids 30 minutes before or after a meal to help lessen the feeling of a full stomach.  Drink 100% fruit juice or an electrolyte drink. An electrolyte drink contains sodium, potassium, and chloride.  Drink fluids that are cold, clear, and carbonated or sour. These include lemonade, ginger ale, lemon–lime soda, ice water, and sparkling water.  Things to avoid    Avoid the following:  Eating foods that trigger your symptoms. These may include spicy foods, coffee, high-fat foods, very sweet foods, and acidic foods.  Drinking more than 1 cup of fluid at a time.  Skipping meals. Nausea can be more intense on an empty stomach. If you cannot tolerate food, do not force it. Try sucking on ice chips or other frozen items and make up for missed calories later.  Lying down within 2 hours after eating.  Being exposed to environmental triggers. These may include food smells, smoky rooms, closed spaces, rooms with strong smells, warm or humid places, overly loud and noisy rooms, and rooms with motion or flickering lights. Try eating meals in a well-ventilated area that is free of strong smells.  Making quick and sudden changes in your movement.  Taking iron pills and multivitamins that contain iron. If you take prescription iron pills, do not stop taking them unless your health care provider approves.  Preparing food. The smell of food can spoil your appetite or trigger nausea.  General instructions    Brush your teeth or use a mouth rinse after meals.  Take over-the-counter and prescription medicines only as told by your health care provider.  Follow instructions from your health care provider about eating or drinking restrictions.  Talk with your health care provider about starting a supplement of vitamin B6.  Continue to take your prenatal vitamins as told by your health care provider. If you are having trouble taking your prenatal vitamins, talk with your health care provider about other options.  Keep all follow-up visits. This is important. Follow-up visits include prenatal visits.  Contact a health care provider if:  You have pain in your abdomen.  You have a severe headache.  You have vision problems.  You are losing weight.  You feel weak or dizzy.  You cannot eat or drink without vomiting, especially if this goes on for a full day.  Get help right away if:  You cannot drink fluids without vomiting.  You vomit blood.  You have constant nausea and vomiting.  You are very weak.  You faint.  You have a fever and your symptoms suddenly get worse.  Summary  Hyperemesis gravidarum is a severe form of nausea and vomiting that happens during pregnancy.  Making some changes to your eating habits may help relieve nausea and vomiting.  This condition may be managed with lifestyle changes and medicines as prescribed by your health care provider.  If medicines do not help relieve nausea and vomiting, you may need to receive fluids through an IV at the hospital.

## 2024-03-18 NOTE — ED STATDOCS - ATTENDING APP SHARED VISIT CONTRIBUTION OF CARE
I, David Mena MD, personally saw the patient with ACP.  I have personally performed a face to face diagnostic evaluation on this patient.  I have reviewed the ACP note and agree with the history, exam, and plan of care, except as noted. I personally made/approved the management plan and take responsibility for the patient management   The initial assessment was performed by myself and then the patient was handed off to the ACP. The patient was followed and re-evaluated by the ACP. All labs, imaging and procedures were evaluated and performed by the ACP and I was available for consultation if any questions in the patients care came up.   I personally made/approved the management plan and take responsibility for the patient management.

## 2024-03-18 NOTE — ED STATDOCS - CLINICAL SUMMARY MEDICAL DECISION MAKING FREE TEXT BOX
38-year-old female  8 weeks by ultrasound presents the emergency department for vomiting.  Patient states she has hyperemesis gravidarum.  Has had it with her other 2 pregnancies as well.  States that she was at [facility] where her OB/GYN is located yesterday and was treated for hyperemesis.  She had an ultrasound that showed a subchorionic hematoma she is Rh+ did not need RhoGAM.  Patient was feeling better and was discharged however symptoms came back today.  No fevers no dysuria no vaginal bleeding but she does have some spotting.  Exam here is nonfocal will check labs give fluids check fetal heart rate obtain OB/GYN consult and reassess.

## 2024-03-18 NOTE — ED STATDOCS - OBJECTIVE STATEMENT
38-year-old female  8 weeks by ultrasound presents the emergency department for vomiting.  Patient states she has hyperemesis gravidarum.  Has had it with her other 2 pregnancies as well.  States that she was at [facility] where her OB/GYN is located yesterday and was treated for hyperemesis.  She had an ultrasound that showed a subchorionic hematoma she is Rh+ did not need RhoGAM.  Patient was feeling better and was discharged however symptoms came back today.  No fevers no dysuria no vaginal bleeding but she does have some spotting.  Exam here is nonfocal will check labs give fluids check fetal heart rate obtain OB/GYN consult and reassess.

## 2024-03-18 NOTE — ED ADULT NURSE REASSESSMENT NOTE - NS ED NURSE REASSESS COMMENT FT1
HEMA Dorantes made aware of rpt BP s/p second bolus of NS; instructed to give another liter of NS which was started. Detail Level: Zone

## 2024-03-19 ENCOUNTER — APPOINTMENT (OUTPATIENT)
Dept: OBGYN | Facility: CLINIC | Age: 39
End: 2024-03-19
Payer: COMMERCIAL

## 2024-03-19 VITALS
BODY MASS INDEX: 26.13 KG/M2 | DIASTOLIC BLOOD PRESSURE: 60 MMHG | HEIGHT: 62 IN | SYSTOLIC BLOOD PRESSURE: 100 MMHG | WEIGHT: 142 LBS

## 2024-03-19 DIAGNOSIS — O20.0 THREATENED ABORTION: ICD-10-CM

## 2024-03-19 LAB
CULTURE RESULTS: SIGNIFICANT CHANGE UP
SPECIMEN SOURCE: SIGNIFICANT CHANGE UP

## 2024-03-19 PROCEDURE — 76830 TRANSVAGINAL US NON-OB: CPT

## 2024-03-19 PROCEDURE — 99213 OFFICE O/P EST LOW 20 MIN: CPT | Mod: 25

## 2024-04-10 ENCOUNTER — APPOINTMENT (OUTPATIENT)
Dept: ANTEPARTUM | Facility: CLINIC | Age: 39
End: 2024-04-10
Payer: COMMERCIAL

## 2024-04-10 ENCOUNTER — ASOB RESULT (OUTPATIENT)
Age: 39
End: 2024-04-10

## 2024-04-10 PROCEDURE — 76801 OB US < 14 WKS SINGLE FETUS: CPT

## 2024-04-10 PROCEDURE — 76813 OB US NUCHAL MEAS 1 GEST: CPT

## 2024-04-10 PROCEDURE — 36416 COLLJ CAPILLARY BLOOD SPEC: CPT

## 2024-04-15 ENCOUNTER — APPOINTMENT (OUTPATIENT)
Dept: OBGYN | Facility: CLINIC | Age: 39
End: 2024-04-15
Payer: COMMERCIAL

## 2024-04-15 VITALS
DIASTOLIC BLOOD PRESSURE: 60 MMHG | BODY MASS INDEX: 24.66 KG/M2 | WEIGHT: 134 LBS | HEIGHT: 62 IN | SYSTOLIC BLOOD PRESSURE: 108 MMHG

## 2024-04-15 DIAGNOSIS — Z3A.12 12 WEEKS GESTATION OF PREGNANCY: ICD-10-CM

## 2024-04-15 DIAGNOSIS — R35.0 FREQUENCY OF MICTURITION: ICD-10-CM

## 2024-04-15 PROCEDURE — 0502F SUBSEQUENT PRENATAL CARE: CPT

## 2024-04-15 PROCEDURE — 36415 COLL VENOUS BLD VENIPUNCTURE: CPT

## 2024-04-16 ENCOUNTER — NON-APPOINTMENT (OUTPATIENT)
Age: 39
End: 2024-04-16

## 2024-04-19 LAB
BACTERIA UR CULT: NORMAL
BILIRUB UR QL STRIP: NORMAL
GLUCOSE UR-MCNC: NORMAL
HCG UR QL: 1 EU/DL
HGB UR QL STRIP.AUTO: NORMAL
KETONES UR-MCNC: NORMAL
LEUKOCYTE ESTERASE UR QL STRIP: NORMAL
NITRITE UR QL STRIP: NORMAL
PH UR STRIP: 8.5
PROT UR STRIP-MCNC: NORMAL
SP GR UR STRIP: 1.02

## 2024-04-29 DIAGNOSIS — Z34.90 ENCOUNTER FOR SUPERVISION OF NORMAL PREGNANCY, UNSPECIFIED, UNSPECIFIED TRIMESTER: ICD-10-CM

## 2024-05-01 DIAGNOSIS — Z3A.14 14 WEEKS GESTATION OF PREGNANCY: ICD-10-CM

## 2024-05-01 DIAGNOSIS — R60.0 LOCALIZED EDEMA: ICD-10-CM

## 2024-05-01 RX ORDER — ONDANSETRON 8 MG/1
8 TABLET, ORALLY DISINTEGRATING ORAL
Qty: 30 | Refills: 1 | Status: ACTIVE | COMMUNITY
Start: 2024-04-15 | End: 1900-01-01

## 2024-05-01 NOTE — PATIENT PROFILE OB - NS PRO ABUSE SCREEN AFRAID ANYONE YN
[facility] GERIATRIC SERVICES    Code Status:  FULL CODE   Visit Type:   Chief Complaint   Patient presents with    TCU Admission     [facility] 4/22/2024 - 4/24/2024     Facility:  [facility] () [[id number]]         HPI: Irene Pena is a 80 year old female who I am seeing today for admit to the TCU. Past medical history includes venous insufficiency with chronic ulcers to BLE, HTN, GERD, hypothyroidism and recurrent UTI. Pt sent to hospital from her wound clinic. Chronic ulcer to right leg followed by Wound Clinic since 2/24. Pt presented with increased confusion and weakness. Pt worked up for stroke.  MRI HEAD:Severe stenosis distal right V4 segment.  Multiple severe stenoses of the right P2 segment.  Moderate stenosis left P1 segment.MRI NECK:Severe stenosis origin of the right vertebral artery. No hemodynamically significant stenosis in the internal carotid arteries or left vertebral arteryIntracranial atherosclerosis was noted. Pt seen by neurology and statin and ASA recommended. Initially there was concern for UTI however UA unremarkable. Pt treated with ceftriaxone. Family reported longstanding concerns for insomnia. Recommended more workup for possible MCI.      Transitional Care Course: Today pt sitting up in bedside chair. Her  is present on exam. Underlying cognitive impairment noted. Pt answers questions with some prompting. She has low stretch bandaging to BLE. She continues with topical treatment orders in place. She denies any SOB or CP. No headache. Occ dizziness upon rising. She is voiding adequately and having regular bowel movements.     #GERD-on famotidine, Protonix.  #Hypothyroidism-on levothyroxine.  # HTN-on losartan.  #HLD-on simvastatin.       Assessment/Plan:     Lymphedema due to venous insufficiency  -chronic BLE edema.   -Wound to RLE.   -followed by wound clinic since Feb 2024. Has follow up tomorrow.   -OK for lymphedema therapy to eval and treat.   -Continue  topical treatment prior to wrapping 3/weekly.     Personal history of urinary tract infection  -Completed antibiotics.     Primary hypertension  -losartan 100 mg every day.   -monitor bp.   -Follow up BMP.     Hyperlipidemia LDL goal <130  CAD  -Continue ASA and statin.   -Recommend out pt follow up for MCI work up.     Subclinical iodine-deficiency hypothyroidism  -continue PTA levothyroxine.     -Ok for PT, OT to eval and treat.     Active Ambulatory Problems     Diagnosis Date Noted    Secondary lymphedema 05/26/2023    Leg wound, left 09/14/2023    Lymphedema 01/18/2024    Confusion 04/22/2024    Unsteady gait 04/22/2024    Urinary tract infection 04/22/2024    Primary hypertension 04/22/2024    Impaired skin integrity 04/23/2024     Resolved Ambulatory Problems     Diagnosis Date Noted    No Resolved Ambulatory Problems     No Additional Past Medical History     Allergies   Allergen Reactions    Amoxicillin Nausea and Vomiting    Latex Rash    Lisinopril Cough       All Meds and Allergies reviewed in the record at the facility and is the most up-to-date.    Post Discharge Medication Reconciliation Status: discharge medications reconciled, continue medications without change  Current Outpatient Medications   Medication Sig Dispense Refill    aspirin 81 MG EC tablet [ASPIRIN 81 MG EC TABLET] Take 81 mg by mouth daily.      losartan (COZAAR) 100 MG tablet Take 100 mg by mouth daily      melatonin 1 MG TABS tablet Take 1 tablet (1 mg) by mouth nightly as needed for sleep      multivitamin (CENTRUM SILVER) tablet Take 1 tablet by mouth daily      NYAMYC 912278 UNIT/GM external powder Apply topically daily as needed      omeprazole (PRILOSEC) 20 MG capsule Take 20 mg by mouth daily      simvastatin (ZOCOR) 40 MG tablet Take 40 mg by mouth at bedtime      SYNTHROID 125 MCG tablet Take 125 mcg by mouth every morning       No current facility-administered medications for this visit.       REVIEW OF SYSTEMS:   10 point  "review of systems reviewed and pertinent positives in the HPI.     PHYSICAL EXAMINATION:  Physical Exam     Vital signs: BP (!) 186/103   Pulse 52   Temp 97.9  F (36.6  C)   Resp 18   Ht 1.6 m (5' 3\")   Wt 91.2 kg (201 lb)   SpO2 97%   BMI 35.61 kg/m    General: Awake, Alert, oriented x3, sitting up in bedside chair, conversant  HEENT: Pink conjunctiva,moist oral mucosa  NECK: Supple  CVS:  S1  S2, without murmur or gallop.   LUNG: Clear to auscultation, No wheezes, rales or rhonci.  BACK: No kyphosis of the thoracic spine  ABDOMEN: Soft, with positive bowel sounds  EXTREMITIES: Good range of motion on both upper and lower extremities, lymph wraps to BLE.   NEUROLOGIC: Intact, pulses palpable  PSYCHIATRIC: Cognitive impairment noted.       Labs:  All labs reviewed in the nursing home record and Epic   @  Lab Results   Component Value Date    WBC 8.7 04/24/2024     Lab Results   Component Value Date    RBC 5.28 04/24/2024     Lab Results   Component Value Date    HGB 14.2 04/24/2024     Lab Results   Component Value Date    HCT 45.3 04/24/2024     Lab Results   Component Value Date    MCV 86 04/24/2024     Lab Results   Component Value Date    MCH 26.9 04/24/2024     Lab Results   Component Value Date    MCHC 31.3 04/24/2024     Lab Results   Component Value Date    RDW 16.7 04/24/2024     Lab Results   Component Value Date     04/24/2024        @Last Comprehensive Metabolic Panel:  Sodium   Date Value Ref Range Status   04/24/2024 145 135 - 145 mmol/L Final     Comment:     Reference intervals for this test were updated on 09/26/2023 to more accurately reflect our healthy population. There may be differences in the flagging of prior results with similar values performed with this method. Interpretation of those prior results can be made in the context of the updated reference intervals.      Potassium   Date Value Ref Range Status   04/24/2024 3.5 3.4 - 5.3 mmol/L Final     Chloride   Date Value Ref " Range Status   04/24/2024 108 (H) 98 - 107 mmol/L Final     Carbon Dioxide (CO2)   Date Value Ref Range Status   04/24/2024 23 22 - 29 mmol/L Final     Anion Gap   Date Value Ref Range Status   04/24/2024 14 7 - 15 mmol/L Final     Glucose   Date Value Ref Range Status   04/24/2024 136 (H) 70 - 99 mg/dL Final     Urea Nitrogen   Date Value Ref Range Status   04/24/2024 8.1 8.0 - 23.0 mg/dL Final     Creatinine   Date Value Ref Range Status   04/24/2024 0.90 0.51 - 0.95 mg/dL Final     GFR Estimate   Date Value Ref Range Status   04/24/2024 65 >60 mL/min/1.73m2 Final     Calcium   Date Value Ref Range Status   04/24/2024 9.5 8.8 - 10.2 mg/dL Final     > 35 minutes spent preparing for this visit, reviewing medications, labs, imaging as well as face to face with pt and her  and collaborating with nursing.     This note has been dictated using voice recognition software. Any grammatical or context distortions are unintentional and inherent to the software    Electronically signed by: Naima Chisholm, CNP    no

## 2024-05-08 ENCOUNTER — NON-APPOINTMENT (OUTPATIENT)
Age: 39
End: 2024-05-08

## 2024-05-13 ENCOUNTER — APPOINTMENT (OUTPATIENT)
Dept: OBGYN | Facility: CLINIC | Age: 39
End: 2024-05-13
Payer: COMMERCIAL

## 2024-05-13 VITALS
SYSTOLIC BLOOD PRESSURE: 100 MMHG | WEIGHT: 144 LBS | BODY MASS INDEX: 26.5 KG/M2 | HEIGHT: 62 IN | DIASTOLIC BLOOD PRESSURE: 60 MMHG

## 2024-05-13 DIAGNOSIS — O21.0 MILD HYPEREMESIS GRAVIDARUM: ICD-10-CM

## 2024-05-13 DIAGNOSIS — Z3A.15 15 WEEKS GESTATION OF PREGNANCY: ICD-10-CM

## 2024-05-13 PROCEDURE — 36415 COLL VENOUS BLD VENIPUNCTURE: CPT

## 2024-05-13 PROCEDURE — 0502F SUBSEQUENT PRENATAL CARE: CPT

## 2024-05-15 LAB
ALBUMIN SERPL ELPH-MCNC: 3.9 G/DL
ALBUMIN SERPL ELPH-MCNC: 3.9 G/DL
ALP BLD-CCNC: 61 U/L
ALP BLD-CCNC: 62 U/L
ALT SERPL-CCNC: 28 U/L
ALT SERPL-CCNC: 32 U/L
ANION GAP SERPL CALC-SCNC: 14 MMOL/L
AST SERPL-CCNC: 22 U/L
AST SERPL-CCNC: 22 U/L
BILIRUB DIRECT SERPL-MCNC: 0.1 MG/DL
BILIRUB INDIRECT SERPL-MCNC: 0.1 MG/DL
BILIRUB SERPL-MCNC: 0.2 MG/DL
BILIRUB SERPL-MCNC: <0.2 MG/DL
BILIRUB UR QL STRIP: NORMAL
BUN SERPL-MCNC: 8 MG/DL
CALCIUM SERPL-MCNC: 9.2 MG/DL
CHLORIDE SERPL-SCNC: 101 MMOL/L
CO2 SERPL-SCNC: 19 MMOL/L
CREAT SERPL-MCNC: 0.51 MG/DL
EGFR: 124 ML/MIN/1.73M2
GLUCOSE SERPL-MCNC: 63 MG/DL
GLUCOSE UR-MCNC: NORMAL
HCG UR QL: 0.2 EU/DL
HGB UR QL STRIP.AUTO: NORMAL
HIV1+2 AB SPEC QL IA.RAPID: NONREACTIVE
KETONES UR-MCNC: NORMAL
LEUKOCYTE ESTERASE UR QL STRIP: NORMAL
NITRITE UR QL STRIP: NORMAL
PH UR STRIP: 6
POTASSIUM SERPL-SCNC: 4.4 MMOL/L
PROT SERPL-MCNC: 6.6 G/DL
PROT SERPL-MCNC: 6.7 G/DL
PROT UR STRIP-MCNC: NORMAL
SODIUM SERPL-SCNC: 134 MMOL/L
SP GR UR STRIP: 1.03

## 2024-05-20 LAB
AFP MOM: 0.76
AFP VALUE: 28.8 NG/ML
ALPHA FETOPROTEIN SERUM COMMENT: NORMAL
ALPHA FETOPROTEIN SERUM INTERPRETATION: NORMAL
ALPHA FETOPROTEIN SERUM RESULTS: NORMAL
ALPHA FETOPROTEIN SERUM TEST RESULTS: NORMAL
GESTATIONAL AGE BASED ON: NORMAL
GESTATIONAL AGE ON COLLECTION DATE: 16.4 WEEKS
INSULIN DEP DIABETES: NORMAL
MATERNAL AGE AT EDD AFP: 37 YR
MULTIPLE GESTATION: NO
OSBR RISK 1 IN: NORMAL
RACE: NORMAL
T PALLIDUM AB SER QL IA: NEGATIVE
WEIGHT AFP: 135 LBS

## 2024-06-03 ENCOUNTER — APPOINTMENT (OUTPATIENT)
Dept: OBGYN | Facility: CLINIC | Age: 39
End: 2024-06-03
Payer: COMMERCIAL

## 2024-06-03 VITALS
DIASTOLIC BLOOD PRESSURE: 60 MMHG | HEIGHT: 62 IN | WEIGHT: 152 LBS | SYSTOLIC BLOOD PRESSURE: 94 MMHG | BODY MASS INDEX: 27.97 KG/M2

## 2024-06-03 DIAGNOSIS — Z3A.18 18 WEEKS GESTATION OF PREGNANCY: ICD-10-CM

## 2024-06-03 PROCEDURE — 0502F SUBSEQUENT PRENATAL CARE: CPT

## 2024-06-04 LAB
BILIRUB UR QL STRIP: NORMAL
GLUCOSE UR-MCNC: NORMAL
HCG UR QL: 0.2 EU/DL
HGB UR QL STRIP.AUTO: NORMAL
KETONES UR-MCNC: NORMAL
LEUKOCYTE ESTERASE UR QL STRIP: NORMAL
NITRITE UR QL STRIP: NORMAL
PH UR STRIP: 7
PROT UR STRIP-MCNC: NORMAL
SP GR UR STRIP: 1.02

## 2024-06-05 ENCOUNTER — APPOINTMENT (OUTPATIENT)
Dept: ANTEPARTUM | Facility: CLINIC | Age: 39
End: 2024-06-05
Payer: COMMERCIAL

## 2024-06-05 ENCOUNTER — ASOB RESULT (OUTPATIENT)
Age: 39
End: 2024-06-05

## 2024-06-05 PROBLEM — Z3A.18 18 WEEKS GESTATION OF PREGNANCY: Status: ACTIVE | Noted: 2024-06-03

## 2024-06-05 PROCEDURE — 76811 OB US DETAILED SNGL FETUS: CPT

## 2024-07-01 ENCOUNTER — APPOINTMENT (OUTPATIENT)
Dept: OBGYN | Facility: CLINIC | Age: 39
End: 2024-07-01
Payer: COMMERCIAL

## 2024-07-01 VITALS
BODY MASS INDEX: 28.89 KG/M2 | HEIGHT: 62 IN | WEIGHT: 157 LBS | DIASTOLIC BLOOD PRESSURE: 56 MMHG | SYSTOLIC BLOOD PRESSURE: 100 MMHG

## 2024-07-01 DIAGNOSIS — Z3A.22 22 WEEKS GESTATION OF PREGNANCY: ICD-10-CM

## 2024-07-01 PROCEDURE — 0502F SUBSEQUENT PRENATAL CARE: CPT

## 2024-07-16 ENCOUNTER — APPOINTMENT (OUTPATIENT)
Dept: OBGYN | Facility: CLINIC | Age: 39
End: 2024-07-16
Payer: COMMERCIAL

## 2024-07-16 VITALS
DIASTOLIC BLOOD PRESSURE: 62 MMHG | WEIGHT: 162 LBS | HEIGHT: 62 IN | SYSTOLIC BLOOD PRESSURE: 100 MMHG | BODY MASS INDEX: 29.81 KG/M2

## 2024-07-16 DIAGNOSIS — Z3A.25 25 WEEKS GESTATION OF PREGNANCY: ICD-10-CM

## 2024-07-16 PROCEDURE — 0502F SUBSEQUENT PRENATAL CARE: CPT

## 2024-08-02 ENCOUNTER — NON-APPOINTMENT (OUTPATIENT)
Age: 39
End: 2024-08-02

## 2024-08-12 ENCOUNTER — NON-APPOINTMENT (OUTPATIENT)
Age: 39
End: 2024-08-12

## 2024-08-12 ENCOUNTER — APPOINTMENT (OUTPATIENT)
Dept: OBGYN | Facility: CLINIC | Age: 39
End: 2024-08-12

## 2024-08-13 ENCOUNTER — APPOINTMENT (OUTPATIENT)
Dept: OBGYN | Facility: CLINIC | Age: 39
End: 2024-08-13
Payer: COMMERCIAL

## 2024-08-13 VITALS
WEIGHT: 168 LBS | HEIGHT: 62 IN | BODY MASS INDEX: 30.91 KG/M2 | SYSTOLIC BLOOD PRESSURE: 100 MMHG | DIASTOLIC BLOOD PRESSURE: 62 MMHG

## 2024-08-13 DIAGNOSIS — Z3A.29 29 WEEKS GESTATION OF PREGNANCY: ICD-10-CM

## 2024-08-13 PROCEDURE — 0502F SUBSEQUENT PRENATAL CARE: CPT

## 2024-08-26 ENCOUNTER — APPOINTMENT (OUTPATIENT)
Dept: OBGYN | Facility: CLINIC | Age: 39
End: 2024-08-26

## 2024-08-27 ENCOUNTER — APPOINTMENT (OUTPATIENT)
Dept: OBGYN | Facility: CLINIC | Age: 39
End: 2024-08-27
Payer: COMMERCIAL

## 2024-08-27 VITALS
DIASTOLIC BLOOD PRESSURE: 60 MMHG | SYSTOLIC BLOOD PRESSURE: 92 MMHG | HEIGHT: 62 IN | WEIGHT: 170 LBS | BODY MASS INDEX: 31.28 KG/M2

## 2024-08-27 DIAGNOSIS — Z3A.31 31 WEEKS GESTATION OF PREGNANCY: ICD-10-CM

## 2024-08-27 PROCEDURE — 0502F SUBSEQUENT PRENATAL CARE: CPT

## 2024-08-29 ENCOUNTER — ASOB RESULT (OUTPATIENT)
Age: 39
End: 2024-08-29

## 2024-08-29 ENCOUNTER — APPOINTMENT (OUTPATIENT)
Dept: ANTEPARTUM | Facility: CLINIC | Age: 39
End: 2024-08-29

## 2024-08-29 PROCEDURE — 76816 OB US FOLLOW-UP PER FETUS: CPT

## 2024-08-29 PROCEDURE — 76819 FETAL BIOPHYS PROFIL W/O NST: CPT | Mod: 59

## 2024-09-03 ENCOUNTER — OUTPATIENT (OUTPATIENT)
Dept: INPATIENT UNIT | Facility: HOSPITAL | Age: 39
LOS: 1 days | Discharge: HOME | End: 2024-09-03
Payer: COMMERCIAL

## 2024-09-03 ENCOUNTER — APPOINTMENT (OUTPATIENT)
Dept: ANTEPARTUM | Facility: CLINIC | Age: 39
End: 2024-09-03

## 2024-09-03 VITALS — SYSTOLIC BLOOD PRESSURE: 99 MMHG | HEART RATE: 88 BPM | DIASTOLIC BLOOD PRESSURE: 59 MMHG

## 2024-09-03 VITALS — TEMPERATURE: 98 F

## 2024-09-03 DIAGNOSIS — Z98.890 OTHER SPECIFIED POSTPROCEDURAL STATES: Chronic | ICD-10-CM

## 2024-09-03 DIAGNOSIS — O26.899 OTHER SPECIFIED PREGNANCY RELATED CONDITIONS, UNSPECIFIED TRIMESTER: ICD-10-CM

## 2024-09-03 LAB
APPEARANCE UR: ABNORMAL
BACTERIA # UR AUTO: NEGATIVE /HPF — SIGNIFICANT CHANGE UP
BILIRUB UR-MCNC: NEGATIVE — SIGNIFICANT CHANGE UP
CAST: 0 /LPF — SIGNIFICANT CHANGE UP (ref 0–4)
COLOR SPEC: YELLOW — SIGNIFICANT CHANGE UP
DIFF PNL FLD: NEGATIVE — SIGNIFICANT CHANGE UP
GLUCOSE UR QL: NEGATIVE MG/DL — SIGNIFICANT CHANGE UP
KETONES UR-MCNC: NEGATIVE MG/DL — SIGNIFICANT CHANGE UP
LEUKOCYTE ESTERASE UR-ACNC: NEGATIVE — SIGNIFICANT CHANGE UP
NITRITE UR-MCNC: NEGATIVE — SIGNIFICANT CHANGE UP
PH UR: 7 — SIGNIFICANT CHANGE UP (ref 5–8)
PROT UR-MCNC: SIGNIFICANT CHANGE UP MG/DL
RBC CASTS # UR COMP ASSIST: 0 /HPF — SIGNIFICANT CHANGE UP (ref 0–4)
SP GR SPEC: 1.02 — SIGNIFICANT CHANGE UP (ref 1–1.03)
SQUAMOUS # UR AUTO: 1 /HPF — SIGNIFICANT CHANGE UP (ref 0–5)
UROBILINOGEN FLD QL: 1 MG/DL — SIGNIFICANT CHANGE UP (ref 0.2–1)
WBC UR QL: 0 /HPF — SIGNIFICANT CHANGE UP (ref 0–5)

## 2024-09-03 PROCEDURE — 99213 OFFICE O/P EST LOW 20 MIN: CPT

## 2024-09-03 PROCEDURE — 76817 TRANSVAGINAL US OBSTETRIC: CPT | Mod: 26

## 2024-09-03 PROCEDURE — 76815 OB US LIMITED FETUS(S): CPT | Mod: 26

## 2024-09-03 PROCEDURE — 76819 FETAL BIOPHYS PROFIL W/O NST: CPT | Mod: 26

## 2024-09-03 NOTE — OB PROVIDER TRIAGE NOTE - NSHPPHYSICALEXAM_GEN_ALL_CORE
T(C): 36.7 (09-03-24 @ 19:30), Max: 36.7 (09-03-24 @ 19:30)  HR: 86 (09-03-24 @ 19:32) (86 - 86)  BP: 113/62 (09-03-24 @ 19:32) (113/62 - 113/62)  RR: --  SpO2: --  General: Female sitting comfortably   Neuro: No facial asymmetry, no slurred speech, moves all 4 extremities  Mood: Alert and lucid, appropriate mood and affect  Head: Normocephalic. Atraumatic.   Abdomen: Soft, nontender. Gravid.   TAUS:  vertex, anterior placenat, mvp 3.7, tristan 11.3cm, m mode 155, bpp 8/8, cervical length 3.2-3.3cm. Sono saved in ASOB.   NST:  baseline 145, moderate variability, +accels, 1 x late decel at 2025. Reactive  SSE: no pooling, cervix appears closed, no bleeding   SVE: 0.5/30/-3

## 2024-09-03 NOTE — OB PROVIDER TRIAGE NOTE - HISTORY OF PRESENT ILLNESS
This is a 37 y/o  at 32 weeks who presents with cramping since last nights. She states last night she was feeling every 3-4 minutes and then subsided. This afternoon they came back. She states theyre not as frequent now and states pain is 7/10. She denies leaking of fluid, vaginal bleeding, decreased fetal movement.   Denies urinary symptoms    PNC with Dr Agrawal. AP course complicated by:  - Hyperemesis in first trimester   - Subchorionic hematoma resolved   - Last ATU scan  1868gm 37%ile

## 2024-09-03 NOTE — OB RN TRIAGE NOTE - FALL HARM RISK - UNIVERSAL INTERVENTIONS
Bed in lowest position, wheels locked, appropriate side rails in place/Call bell, personal items and telephone in reach/Instruct patient to call for assistance before getting out of bed or chair/Non-slip footwear when patient is out of bed/[place] to call system/Physically safe environment - no spills, clutter or unnecessary equipment/Purposeful Proactive Rounding/Room/bathroom lighting operational, light cord in reach

## 2024-09-03 NOTE — OB RN TRIAGE NOTE - AS TEMP SITE
CC:Ear Cleaning   HPI:Ms. Luz is a 89-year-old  female whose ambulating with a cane.  She utilizeses 2 hearing aids.    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  She indicates the need for an ear cleaning procedure.  Her ears were last cleaned by me in mid July 2017.  Cerumen was removed from both ear canals at that visit.    Her medical problem list includes kyphoscoliosis, osteoporosis, ptosis and blepharospasm affecting both eyes, anemia, debility, hypertension among others.    PE:  Blood pressure 141/59 pulse 65 temperature 96.9° height 5 ft 1 in weight 90 lb  General:  Alert and oriented lady in no acute distress  Both ears were examined under the microscope in the micro procedure room.  Procedures:  Cerumen impaction is extracted from the right ear canal with a blunt curette and angled pick.  A larger cerumen impactions extracted from left ear canal with a blunt curette and an angle pick.  Both eardrums are intact and clear when visualized.  Ms. Luz immediately replaces her hearing aids.      DIAGNOSIS:     ICD-10-CM ICD-9-CM    1. Dry ear canal, bilateral H61.893 380.89    2. Wears hearing aid in both ears Z97.4 [id number]    3. Impacted cerumen, bilateral H61.23 380.4      PLAN: Some cerumen removed from AD eac ; larger C.I. extracted from AS eac  RTC yearly for cleaning /prn    
oral

## 2024-09-03 NOTE — OB PROVIDER TRIAGE NOTE - NSHPLABSRESULTS_GEN_ALL_CORE
Urinalysis Basic - ( 03 Sep 2024 21:05 )    Color: Yellow / Appearance: Cloudy / S.019 / pH: x  Gluc: x / Ketone: Negative mg/dL  / Bili: Negative / Urobili: 1.0 mg/dL   Blood: x / Protein: Trace mg/dL / Nitrite: Negative   Leuk Esterase: Negative / RBC: 0 /HPF / WBC 0 /HPF   Sq Epi: x / Non Sq Epi: 1 /HPF / Bacteria: Negative /HPF

## 2024-09-03 NOTE — OB PROVIDER TRIAGE NOTE - NSOBPROVIDERNOTE_OBGYN_ALL_OB_FT
This is a  at 32 weeks with  contractions. cervical length 3.2-3.3, Cervix 0.5/30/-3. NST reactive with 1 x late decel. BPP     - Continue to monitor x 1 hour after decel  - IV hydration- pt is refusing IV hydration and wants to orally hydrate   - UA sent  - Discussed with Dr Willams This is a  at 32 weeks with  contractions. cervical length 3.2-3.3, Cervix 0.5/30/-3. NST reactive with 1 x late decel. BPP     - Continue to monitor x 1 hour after decel  - IV hydration- pt is refusing IV hydration and wants to orally hydrate   - UA sent  - Discussed with Dr Willams    Update 1030pm:   - SVE unchanged   - UA wnl   - Pt is still feeling contractions however she is still refusing IV hydration  - Discussed with Dr. Imer Celis   - Patient to be discharged home with follow up and return precautions  - Please follow up with your obstetrician at your next scheduled appointment.   - Please return for decreased/no fetal movement, vaginal bleeding similar to that of a period, leaking/gush of fluid, regular contractions occurring 4-5 minutes for one hour or requiring pain medication   - Patient and partner and educated of plan and demonstrate understanding. All questions answered. Discharge instructions provided and signed.   - Discharged at 2240

## 2024-09-04 PROBLEM — E07.9 DISORDER OF THYROID, UNSPECIFIED: Chronic | Status: INACTIVE | Noted: 2018-10-31 | Resolved: 2024-09-03

## 2024-09-05 DIAGNOSIS — O47.03 FALSE LABOR BEFORE 37 COMPLETED WEEKS OF GESTATION, THIRD TRIMESTER: ICD-10-CM

## 2024-09-05 DIAGNOSIS — D64.9 ANEMIA, UNSPECIFIED: ICD-10-CM

## 2024-09-05 DIAGNOSIS — O09.13 SUPERVISION OF PREGNANCY WITH HISTORY OF ECTOPIC PREGNANCY, THIRD TRIMESTER: ICD-10-CM

## 2024-09-05 DIAGNOSIS — O09.523 SUPERVISION OF ELDERLY MULTIGRAVIDA, THIRD TRIMESTER: ICD-10-CM

## 2024-09-05 DIAGNOSIS — Z3A.32 32 WEEKS GESTATION OF PREGNANCY: ICD-10-CM

## 2024-09-05 DIAGNOSIS — O99.013 ANEMIA COMPLICATING PREGNANCY, THIRD TRIMESTER: ICD-10-CM

## 2024-09-10 ENCOUNTER — APPOINTMENT (OUTPATIENT)
Dept: OBGYN | Facility: CLINIC | Age: 39
End: 2024-09-10
Payer: COMMERCIAL

## 2024-09-10 ENCOUNTER — NON-APPOINTMENT (OUTPATIENT)
Age: 39
End: 2024-09-10

## 2024-09-10 VITALS
HEIGHT: 62 IN | SYSTOLIC BLOOD PRESSURE: 120 MMHG | WEIGHT: 175 LBS | DIASTOLIC BLOOD PRESSURE: 70 MMHG | BODY MASS INDEX: 32.2 KG/M2

## 2024-09-10 DIAGNOSIS — Z3A.33 33 WEEKS GESTATION OF PREGNANCY: ICD-10-CM

## 2024-09-10 DIAGNOSIS — Z23 ENCOUNTER FOR IMMUNIZATION: ICD-10-CM

## 2024-09-10 PROCEDURE — 0502F SUBSEQUENT PRENATAL CARE: CPT

## 2024-09-10 PROCEDURE — 90471 IMMUNIZATION ADMIN: CPT

## 2024-09-10 PROCEDURE — 90715 TDAP VACCINE 7 YRS/> IM: CPT

## 2024-09-23 ENCOUNTER — APPOINTMENT (OUTPATIENT)
Dept: OBGYN | Facility: CLINIC | Age: 39
End: 2024-09-23
Payer: COMMERCIAL

## 2024-09-23 VITALS
BODY MASS INDEX: 32.2 KG/M2 | WEIGHT: 175 LBS | HEIGHT: 62 IN | SYSTOLIC BLOOD PRESSURE: 120 MMHG | DIASTOLIC BLOOD PRESSURE: 60 MMHG

## 2024-09-23 DIAGNOSIS — Z36.85 ENCOUNTER FOR ANTENATAL SCREENING FOR STREPTOCOCCUS B: ICD-10-CM

## 2024-09-23 DIAGNOSIS — Z3A.35 35 WEEKS GESTATION OF PREGNANCY: ICD-10-CM

## 2024-09-23 PROBLEM — M54.30 SCIATICA, UNSPECIFIED SIDE: Chronic | Status: ACTIVE | Noted: 2024-09-03

## 2024-09-23 LAB
BILIRUB UR QL STRIP: NORMAL
GLUCOSE UR-MCNC: NORMAL
HCG UR QL: 0.2 EU/DL
HGB UR QL STRIP.AUTO: NORMAL
KETONES UR-MCNC: NORMAL
LEUKOCYTE ESTERASE UR QL STRIP: NORMAL
NITRITE UR QL STRIP: NORMAL
PH UR STRIP: 7
PROT UR STRIP-MCNC: NORMAL
SP GR UR STRIP: 1.03

## 2024-09-23 PROCEDURE — 0502F SUBSEQUENT PRENATAL CARE: CPT

## 2024-09-25 LAB
GP B STREP DNA SPEC QL NAA+PROBE: NOT DETECTED
SOURCE GBS: NORMAL

## 2024-09-30 ENCOUNTER — APPOINTMENT (OUTPATIENT)
Dept: OBGYN | Facility: CLINIC | Age: 39
End: 2024-09-30
Payer: COMMERCIAL

## 2024-09-30 VITALS
DIASTOLIC BLOOD PRESSURE: 66 MMHG | HEIGHT: 62 IN | BODY MASS INDEX: 32.2 KG/M2 | SYSTOLIC BLOOD PRESSURE: 102 MMHG | WEIGHT: 175 LBS

## 2024-09-30 DIAGNOSIS — Z3A.36 36 WEEKS GESTATION OF PREGNANCY: ICD-10-CM

## 2024-09-30 PROCEDURE — 0502F SUBSEQUENT PRENATAL CARE: CPT

## 2024-10-07 ENCOUNTER — APPOINTMENT (OUTPATIENT)
Dept: OBGYN | Facility: CLINIC | Age: 39
End: 2024-10-07
Payer: COMMERCIAL

## 2024-10-07 VITALS
SYSTOLIC BLOOD PRESSURE: 94 MMHG | HEIGHT: 62 IN | BODY MASS INDEX: 32.76 KG/M2 | DIASTOLIC BLOOD PRESSURE: 64 MMHG | WEIGHT: 178 LBS

## 2024-10-07 DIAGNOSIS — Z3A.37 37 WEEKS GESTATION OF PREGNANCY: ICD-10-CM

## 2024-10-07 PROCEDURE — 0502F SUBSEQUENT PRENATAL CARE: CPT

## 2024-10-11 ENCOUNTER — NON-APPOINTMENT (OUTPATIENT)
Age: 39
End: 2024-10-11

## 2024-10-11 ENCOUNTER — OUTPATIENT (OUTPATIENT)
Dept: INPATIENT UNIT | Facility: HOSPITAL | Age: 39
LOS: 1 days | Discharge: HOME | End: 2024-10-11

## 2024-10-11 ENCOUNTER — APPOINTMENT (OUTPATIENT)
Dept: ANTEPARTUM | Facility: CLINIC | Age: 39
End: 2024-10-11

## 2024-10-11 VITALS — SYSTOLIC BLOOD PRESSURE: 104 MMHG | HEART RATE: 82 BPM | DIASTOLIC BLOOD PRESSURE: 67 MMHG

## 2024-10-11 VITALS — SYSTOLIC BLOOD PRESSURE: 108 MMHG | DIASTOLIC BLOOD PRESSURE: 68 MMHG | HEART RATE: 93 BPM

## 2024-10-11 DIAGNOSIS — O26.899 OTHER SPECIFIED PREGNANCY RELATED CONDITIONS, UNSPECIFIED TRIMESTER: ICD-10-CM

## 2024-10-11 PROCEDURE — 99213 OFFICE O/P EST LOW 20 MIN: CPT | Mod: 1L

## 2024-10-11 NOTE — OB PROVIDER TRIAGE NOTE - NSOBPROVIDERNOTE_OBGYN_ALL_OB_FT
37 y/o  @ 38 weeks r/o labor, decreased fetal movement now feeling movement  -NST in progress  -Labor precautions reviewed 37 y/o  @ 38 weeks r/o labor, decreased fetal movement now feeling movement  -NST in progress  -Labor precautions reviewed    2027: NST reative, BPP 8/8, patient now feeling movement. Labor precautions reviewed.

## 2024-10-11 NOTE — OB PROVIDER TRIAGE NOTE - HISTORY OF PRESENT ILLNESS
37 yo  @ 38 weeks presents with contractions q5-10 min and decreased fetal movement. Patient now feeling movement. Prenatal care complicated by hyperemesis and sciatica. Denies vaginal bleeding or leaking of fluid.

## 2024-10-14 ENCOUNTER — APPOINTMENT (OUTPATIENT)
Dept: OBGYN | Facility: CLINIC | Age: 39
End: 2024-10-14

## 2024-10-14 DIAGNOSIS — O09.523 SUPERVISION OF ELDERLY MULTIGRAVIDA, THIRD TRIMESTER: ICD-10-CM

## 2024-10-14 DIAGNOSIS — O99.013 ANEMIA COMPLICATING PREGNANCY, THIRD TRIMESTER: ICD-10-CM

## 2024-10-14 DIAGNOSIS — Z3A.38 38 WEEKS GESTATION OF PREGNANCY: ICD-10-CM

## 2024-10-14 DIAGNOSIS — D64.9 ANEMIA, UNSPECIFIED: ICD-10-CM

## 2024-10-14 DIAGNOSIS — O36.8130 DECREASED FETAL MOVEMENTS, THIRD TRIMESTER, NOT APPLICABLE OR UNSPECIFIED: ICD-10-CM

## 2024-10-17 ENCOUNTER — INPATIENT (INPATIENT)
Facility: HOSPITAL | Age: 39
LOS: 1 days | Discharge: HOME | End: 2024-10-19
Attending: OBSTETRICS & GYNECOLOGY | Admitting: OBSTETRICS & GYNECOLOGY
Payer: COMMERCIAL

## 2024-10-17 ENCOUNTER — APPOINTMENT (OUTPATIENT)
Dept: ANTEPARTUM | Facility: CLINIC | Age: 39
End: 2024-10-17

## 2024-10-17 VITALS
RESPIRATION RATE: 14 BRPM | DIASTOLIC BLOOD PRESSURE: 82 MMHG | SYSTOLIC BLOOD PRESSURE: 126 MMHG | HEART RATE: 107 BPM | TEMPERATURE: 98 F

## 2024-10-17 DIAGNOSIS — Z98.890 OTHER SPECIFIED POSTPROCEDURAL STATES: Chronic | ICD-10-CM

## 2024-10-17 DIAGNOSIS — O26.899 OTHER SPECIFIED PREGNANCY RELATED CONDITIONS, UNSPECIFIED TRIMESTER: ICD-10-CM

## 2024-10-17 LAB
BASOPHILS # BLD AUTO: 0.02 K/UL — SIGNIFICANT CHANGE UP (ref 0–0.2)
BASOPHILS NFR BLD AUTO: 0.2 % — SIGNIFICANT CHANGE UP (ref 0–2)
BLD GP AB SCN SERPL QL: NEGATIVE — SIGNIFICANT CHANGE UP
EOSINOPHIL # BLD AUTO: 0.06 K/UL — SIGNIFICANT CHANGE UP (ref 0–0.5)
EOSINOPHIL NFR BLD AUTO: 0.5 % — SIGNIFICANT CHANGE UP (ref 0–6)
HCT VFR BLD CALC: 34.2 % — LOW (ref 34.5–45)
HGB BLD-MCNC: 10.1 G/DL — LOW (ref 11.5–15.5)
IANC: 10.21 K/UL — HIGH (ref 1.8–7.4)
IMM GRANULOCYTES NFR BLD AUTO: 0.9 % — SIGNIFICANT CHANGE UP (ref 0–0.9)
LYMPHOCYTES # BLD AUTO: 1.4 K/UL — SIGNIFICANT CHANGE UP (ref 1–3.3)
LYMPHOCYTES # BLD AUTO: 10.9 % — LOW (ref 13–44)
MCHC RBC-ENTMCNC: 22.7 PG — LOW (ref 27–34)
MCHC RBC-ENTMCNC: 29.5 GM/DL — LOW (ref 32–36)
MCV RBC AUTO: 76.9 FL — LOW (ref 80–100)
MONOCYTES # BLD AUTO: 1.06 K/UL — HIGH (ref 0–0.9)
MONOCYTES NFR BLD AUTO: 8.2 % — SIGNIFICANT CHANGE UP (ref 2–14)
NEUTROPHILS # BLD AUTO: 10.21 K/UL — HIGH (ref 1.8–7.4)
NEUTROPHILS NFR BLD AUTO: 79.3 % — HIGH (ref 43–77)
NRBC # BLD AUTO: 0 K/UL — SIGNIFICANT CHANGE UP (ref 0–0)
NRBC # BLD: 0 /100 WBCS — SIGNIFICANT CHANGE UP (ref 0–0)
NRBC # FLD: 0 K/UL — SIGNIFICANT CHANGE UP (ref 0–0)
NRBC BLD-RTO: 0 /100 WBCS — SIGNIFICANT CHANGE UP (ref 0–0)
PLATELET # BLD AUTO: 359 K/UL — SIGNIFICANT CHANGE UP (ref 150–400)
RBC # BLD: 4.45 M/UL — SIGNIFICANT CHANGE UP (ref 3.8–5.2)
RBC # FLD: 16.4 % — HIGH (ref 10.3–14.5)
RH IG SCN BLD-IMP: POSITIVE — SIGNIFICANT CHANGE UP
RH IG SCN BLD-IMP: POSITIVE — SIGNIFICANT CHANGE UP
T PALLIDUM AB TITR SER: NEGATIVE — SIGNIFICANT CHANGE UP
WBC # BLD: 12.86 K/UL — HIGH (ref 3.8–10.5)
WBC # FLD AUTO: 12.86 K/UL — HIGH (ref 3.8–10.5)

## 2024-10-17 RX ORDER — ACETAMINOPHEN 500 MG/5ML
975 LIQUID (ML) ORAL
Refills: 0 | Status: DISCONTINUED | OUTPATIENT
Start: 2024-10-17 | End: 2024-10-19

## 2024-10-17 RX ORDER — HYDROCORTISONE 10 MG/G
1 CREAM TOPICAL EVERY 6 HOURS
Refills: 0 | Status: DISCONTINUED | OUTPATIENT
Start: 2024-10-17 | End: 2024-10-19

## 2024-10-17 RX ORDER — OXYCODONE HYDROCHLORIDE 30 MG/1
5 TABLET ORAL ONCE
Refills: 0 | Status: DISCONTINUED | OUTPATIENT
Start: 2024-10-17 | End: 2024-10-19

## 2024-10-17 RX ORDER — KETOROLAC TROMETHAMINE 30 MG/ML
30 INJECTION, SOLUTION INTRAMUSCULAR; INTRAVENOUS ONCE
Refills: 0 | Status: DISCONTINUED | OUTPATIENT
Start: 2024-10-17 | End: 2024-10-17

## 2024-10-17 RX ORDER — MAGNESIUM HYDROXIDE 400 MG/5ML
30 SUSPENSION ORAL
Refills: 0 | Status: DISCONTINUED | OUTPATIENT
Start: 2024-10-17 | End: 2024-10-19

## 2024-10-17 RX ORDER — OXYTOCIN-SODIUM CHLORIDE 0.9% IV SOLN 30 UNIT/500ML 30-0.9/5 UT/ML-%
167 SOLUTION INTRAVENOUS
Qty: 30 | Refills: 0 | Status: DISCONTINUED | OUTPATIENT
Start: 2024-10-17 | End: 2024-10-18

## 2024-10-17 RX ORDER — SIMETHICONE 80 MG
80 TABLET,CHEWABLE ORAL EVERY 4 HOURS
Refills: 0 | Status: DISCONTINUED | OUTPATIENT
Start: 2024-10-17 | End: 2024-10-19

## 2024-10-17 RX ORDER — PRAMOXINE HCL 1 %
1 GEL (GRAM) TOPICAL EVERY 4 HOURS
Refills: 0 | Status: DISCONTINUED | OUTPATIENT
Start: 2024-10-17 | End: 2024-10-19

## 2024-10-17 RX ORDER — CITRIC ACID/SODIUM CITRATE 300-500 MG
15 SOLUTION, ORAL ORAL EVERY 6 HOURS
Refills: 0 | Status: DISCONTINUED | OUTPATIENT
Start: 2024-10-17 | End: 2024-10-17

## 2024-10-17 RX ORDER — SODIUM CHLORIDE 9 G/1000ML
1000 INJECTION, SOLUTION INTRAVENOUS
Refills: 0 | Status: DISCONTINUED | OUTPATIENT
Start: 2024-10-17 | End: 2024-10-17

## 2024-10-17 RX ORDER — PRENATAL 136/IRON/FOLIC ACID 27 MG-1 MG
1 TABLET ORAL DAILY
Refills: 0 | Status: DISCONTINUED | OUTPATIENT
Start: 2024-10-17 | End: 2024-10-19

## 2024-10-17 RX ORDER — OXYCODONE HYDROCHLORIDE 30 MG/1
5 TABLET ORAL
Refills: 0 | Status: DISCONTINUED | OUTPATIENT
Start: 2024-10-17 | End: 2024-10-19

## 2024-10-17 RX ORDER — BENZOCAINE 220 MG/G
1 SPRAY, METERED PERIODONTAL EVERY 6 HOURS
Refills: 0 | Status: DISCONTINUED | OUTPATIENT
Start: 2024-10-17 | End: 2024-10-19

## 2024-10-17 RX ORDER — INFLUENZA A VIRUS A/IDAHO/07/2018 (H1N1) ANTIGEN (MDCK CELL DERIVED, PROPIOLACTONE INACTIVATED, INFLUENZA A VIRUS A/INDIANA/08/2018 (H3N2) ANTIGEN (MDCK CELL DERIVED, PROPIOLACTONE INACTIVATED), INFLUENZA B VIRUS B/SINGAPORE/INFTT-16-0610/2016 ANTIGEN (MDCK CELL DERIVED, PROPIOLACTONE INACTIVATED), INFLUENZA B VIRUS B/IOWA/06/2017 ANTIGEN (MDCK CELL DERIVED, PROPIOLACTONE INACTIVATED) 15; 15; 15; 15 UG/.5ML; UG/.5ML; UG/.5ML; UG/.5ML
0.5 INJECTION, SUSPENSION INTRAMUSCULAR ONCE
Refills: 0 | Status: DISCONTINUED | OUTPATIENT
Start: 2024-10-17 | End: 2024-10-19

## 2024-10-17 RX ORDER — DIPHENHYDRAMINE HCL 12.5MG/5ML
25 ELIXIR ORAL EVERY 6 HOURS
Refills: 0 | Status: DISCONTINUED | OUTPATIENT
Start: 2024-10-17 | End: 2024-10-19

## 2024-10-17 RX ORDER — DIBUCAINE 10 MG/G
1 OINTMENT TOPICAL EVERY 6 HOURS
Refills: 0 | Status: DISCONTINUED | OUTPATIENT
Start: 2024-10-17 | End: 2024-10-19

## 2024-10-17 RX ORDER — IBUPROFEN 200 MG
600 TABLET ORAL EVERY 6 HOURS
Refills: 0 | Status: DISCONTINUED | OUTPATIENT
Start: 2024-10-17 | End: 2024-10-19

## 2024-10-17 RX ORDER — MODIFIED LANOLIN 100 %
1 CREAM (GRAM) TOPICAL EVERY 6 HOURS
Refills: 0 | Status: DISCONTINUED | OUTPATIENT
Start: 2024-10-17 | End: 2024-10-19

## 2024-10-17 RX ORDER — IBUPROFEN 200 MG
600 TABLET ORAL EVERY 6 HOURS
Refills: 0 | Status: COMPLETED | OUTPATIENT
Start: 2024-10-17 | End: 2025-09-15

## 2024-10-17 RX ORDER — WITCH HAZEL LEAF
1 FLUID EXTRACT MISCELLANEOUS EVERY 4 HOURS
Refills: 0 | Status: DISCONTINUED | OUTPATIENT
Start: 2024-10-17 | End: 2024-10-19

## 2024-10-17 RX ADMIN — KETOROLAC TROMETHAMINE 30 MILLIGRAM(S): 30 INJECTION, SOLUTION INTRAMUSCULAR; INTRAVENOUS at 08:38

## 2024-10-17 RX ADMIN — OXYTOCIN-SODIUM CHLORIDE 0.9% IV SOLN 30 UNIT/500ML 167 MILLIUNIT(S)/MIN: 30-0.9/5 SOLUTION at 05:09

## 2024-10-17 RX ADMIN — Medication 3 MILLILITER(S): at 20:51

## 2024-10-17 RX ADMIN — Medication 600 MILLIGRAM(S): at 19:00

## 2024-10-17 RX ADMIN — Medication 600 MILLIGRAM(S): at 23:59

## 2024-10-17 RX ADMIN — KETOROLAC TROMETHAMINE 30 MILLIGRAM(S): 30 INJECTION, SOLUTION INTRAMUSCULAR; INTRAVENOUS at 09:30

## 2024-10-17 RX ADMIN — Medication 600 MILLIGRAM(S): at 18:26

## 2024-10-17 RX ADMIN — Medication 975 MILLIGRAM(S): at 20:55

## 2024-10-17 RX ADMIN — OXYCODONE HYDROCHLORIDE 5 MILLIGRAM(S): 30 TABLET ORAL at 20:55

## 2024-10-17 RX ADMIN — Medication 975 MILLIGRAM(S): at 14:00

## 2024-10-17 RX ADMIN — Medication 975 MILLIGRAM(S): at 21:40

## 2024-10-17 RX ADMIN — OXYCODONE HYDROCHLORIDE 5 MILLIGRAM(S): 30 TABLET ORAL at 21:40

## 2024-10-18 ENCOUNTER — TRANSCRIPTION ENCOUNTER (OUTPATIENT)
Age: 39
End: 2024-10-18

## 2024-10-18 PROCEDURE — 59400 OBSTETRICAL CARE: CPT

## 2024-10-18 RX ORDER — IBUPROFEN 200 MG
1 TABLET ORAL
Qty: 0 | Refills: 0 | DISCHARGE
Start: 2024-10-18

## 2024-10-18 RX ORDER — ACETAMINOPHEN 500 MG/5ML
3 LIQUID (ML) ORAL
Qty: 0 | Refills: 0 | DISCHARGE
Start: 2024-10-18

## 2024-10-18 RX ADMIN — Medication 600 MILLIGRAM(S): at 17:33

## 2024-10-18 RX ADMIN — Medication 600 MILLIGRAM(S): at 12:30

## 2024-10-18 RX ADMIN — Medication 975 MILLIGRAM(S): at 14:50

## 2024-10-18 RX ADMIN — OXYCODONE HYDROCHLORIDE 5 MILLIGRAM(S): 30 TABLET ORAL at 22:46

## 2024-10-18 RX ADMIN — Medication 600 MILLIGRAM(S): at 06:52

## 2024-10-18 RX ADMIN — Medication 3 MILLILITER(S): at 06:51

## 2024-10-18 RX ADMIN — OXYCODONE HYDROCHLORIDE 5 MILLIGRAM(S): 30 TABLET ORAL at 23:16

## 2024-10-18 RX ADMIN — Medication 600 MILLIGRAM(S): at 00:30

## 2024-10-18 RX ADMIN — Medication 975 MILLIGRAM(S): at 14:21

## 2024-10-18 RX ADMIN — Medication 975 MILLIGRAM(S): at 08:52

## 2024-10-18 RX ADMIN — OXYCODONE HYDROCHLORIDE 5 MILLIGRAM(S): 30 TABLET ORAL at 09:30

## 2024-10-18 RX ADMIN — Medication 975 MILLIGRAM(S): at 23:13

## 2024-10-18 RX ADMIN — Medication 600 MILLIGRAM(S): at 18:24

## 2024-10-18 RX ADMIN — Medication 600 MILLIGRAM(S): at 11:51

## 2024-10-18 RX ADMIN — Medication 600 MILLIGRAM(S): at 05:40

## 2024-10-18 RX ADMIN — Medication 975 MILLIGRAM(S): at 22:43

## 2024-10-18 RX ADMIN — OXYCODONE HYDROCHLORIDE 5 MILLIGRAM(S): 30 TABLET ORAL at 08:53

## 2024-10-18 RX ADMIN — Medication 975 MILLIGRAM(S): at 09:30

## 2024-10-19 VITALS
SYSTOLIC BLOOD PRESSURE: 101 MMHG | DIASTOLIC BLOOD PRESSURE: 71 MMHG | RESPIRATION RATE: 18 BRPM | OXYGEN SATURATION: 99 % | HEART RATE: 84 BPM | TEMPERATURE: 98 F

## 2024-10-19 RX ADMIN — Medication 975 MILLIGRAM(S): at 09:26

## 2024-10-19 RX ADMIN — Medication 975 MILLIGRAM(S): at 10:15

## 2024-10-19 RX ADMIN — Medication 600 MILLIGRAM(S): at 06:03

## 2024-10-19 RX ADMIN — Medication 600 MILLIGRAM(S): at 12:15

## 2024-10-19 RX ADMIN — OXYCODONE HYDROCHLORIDE 5 MILLIGRAM(S): 30 TABLET ORAL at 03:05

## 2024-10-19 RX ADMIN — Medication 600 MILLIGRAM(S): at 05:33

## 2024-10-19 RX ADMIN — Medication 600 MILLIGRAM(S): at 11:34

## 2024-10-19 RX ADMIN — OXYCODONE HYDROCHLORIDE 5 MILLIGRAM(S): 30 TABLET ORAL at 02:35

## 2024-10-21 ENCOUNTER — NON-APPOINTMENT (OUTPATIENT)
Age: 39
End: 2024-10-21

## 2024-11-25 PROBLEM — R11.10 VOMITING, UNSPECIFIED: Chronic | Status: ACTIVE | Noted: 2024-10-17

## 2024-12-03 ENCOUNTER — APPOINTMENT (OUTPATIENT)
Dept: OBGYN | Facility: CLINIC | Age: 39
End: 2024-12-03

## 2024-12-24 ENCOUNTER — EMERGENCY (EMERGENCY)
Facility: HOSPITAL | Age: 39
LOS: 0 days | Discharge: HOME | End: 2024-12-24
Attending: EMERGENCY MEDICINE
Payer: COMMERCIAL

## 2024-12-24 VITALS
HEART RATE: 97 BPM | OXYGEN SATURATION: 100 % | RESPIRATION RATE: 16 BRPM | TEMPERATURE: 100 F | SYSTOLIC BLOOD PRESSURE: 102 MMHG | DIASTOLIC BLOOD PRESSURE: 70 MMHG

## 2024-12-24 VITALS — WEIGHT: 160.28 LBS

## 2024-12-24 DIAGNOSIS — R50.9 FEVER, UNSPECIFIED: ICD-10-CM

## 2024-12-24 DIAGNOSIS — Z98.890 OTHER SPECIFIED POSTPROCEDURAL STATES: Chronic | ICD-10-CM

## 2024-12-24 DIAGNOSIS — Z86.2 PERSONAL HISTORY OF DISEASES OF THE BLOOD AND BLOOD-FORMING ORGANS AND CERTAIN DISORDERS INVOLVING THE IMMUNE MECHANISM: ICD-10-CM

## 2024-12-24 DIAGNOSIS — R59.0 LOCALIZED ENLARGED LYMPH NODES: ICD-10-CM

## 2024-12-24 DIAGNOSIS — Z88.0 ALLERGY STATUS TO PENICILLIN: ICD-10-CM

## 2024-12-24 DIAGNOSIS — K64.4 RESIDUAL HEMORRHOIDAL SKIN TAGS: ICD-10-CM

## 2024-12-24 DIAGNOSIS — J02.9 ACUTE PHARYNGITIS, UNSPECIFIED: ICD-10-CM

## 2024-12-24 LAB
ALBUMIN SERPL ELPH-MCNC: 3.8 G/DL — SIGNIFICANT CHANGE UP (ref 3.3–5)
ALP SERPL-CCNC: 93 U/L — SIGNIFICANT CHANGE UP (ref 40–120)
ALT FLD-CCNC: 53 U/L — SIGNIFICANT CHANGE UP (ref 12–78)
ANION GAP SERPL CALC-SCNC: 4 MMOL/L — LOW (ref 5–17)
APPEARANCE UR: CLEAR — SIGNIFICANT CHANGE UP
APTT BLD: 36.9 SEC — HIGH (ref 24.5–35.6)
AST SERPL-CCNC: 24 U/L — SIGNIFICANT CHANGE UP (ref 15–37)
BACTERIA # UR AUTO: ABNORMAL /HPF
BASOPHILS # BLD AUTO: 0.03 K/UL — SIGNIFICANT CHANGE UP (ref 0–0.2)
BASOPHILS NFR BLD AUTO: 0.2 % — SIGNIFICANT CHANGE UP (ref 0–2)
BILIRUB SERPL-MCNC: 0.6 MG/DL — SIGNIFICANT CHANGE UP (ref 0.2–1.2)
BILIRUB UR-MCNC: NEGATIVE — SIGNIFICANT CHANGE UP
BLD GP AB SCN SERPL QL: SIGNIFICANT CHANGE UP
BUN SERPL-MCNC: 11 MG/DL — SIGNIFICANT CHANGE UP (ref 7–23)
CALCIUM SERPL-MCNC: 9 MG/DL — SIGNIFICANT CHANGE UP (ref 8.5–10.1)
CAST: 0 /LPF — SIGNIFICANT CHANGE UP (ref 0–4)
CHLORIDE SERPL-SCNC: 110 MMOL/L — HIGH (ref 96–108)
CO2 SERPL-SCNC: 26 MMOL/L — SIGNIFICANT CHANGE UP (ref 22–31)
COLOR SPEC: YELLOW — SIGNIFICANT CHANGE UP
CREAT SERPL-MCNC: 0.72 MG/DL — SIGNIFICANT CHANGE UP (ref 0.5–1.3)
DIFF PNL FLD: ABNORMAL
EGFR: 110 ML/MIN/1.73M2 — SIGNIFICANT CHANGE UP
EOSINOPHIL # BLD AUTO: 0.04 K/UL — SIGNIFICANT CHANGE UP (ref 0–0.5)
EOSINOPHIL NFR BLD AUTO: 0.3 % — SIGNIFICANT CHANGE UP (ref 0–6)
FLUAV AG NPH QL: SIGNIFICANT CHANGE UP
FLUBV AG NPH QL: SIGNIFICANT CHANGE UP
GLUCOSE SERPL-MCNC: 114 MG/DL — HIGH (ref 70–99)
GLUCOSE UR QL: NEGATIVE MG/DL — SIGNIFICANT CHANGE UP
HCT VFR BLD CALC: 36.1 % — SIGNIFICANT CHANGE UP (ref 34.5–45)
HGB BLD-MCNC: 11.1 G/DL — LOW (ref 11.5–15.5)
IMM GRANULOCYTES NFR BLD AUTO: 0.6 % — SIGNIFICANT CHANGE UP (ref 0–0.9)
INR BLD: 1 RATIO — SIGNIFICANT CHANGE UP (ref 0.85–1.16)
KETONES UR-MCNC: NEGATIVE MG/DL — SIGNIFICANT CHANGE UP
LEUKOCYTE ESTERASE UR-ACNC: ABNORMAL
LYMPHOCYTES # BLD AUTO: 0.76 K/UL — LOW (ref 1–3.3)
LYMPHOCYTES # BLD AUTO: 4.9 % — LOW (ref 13–44)
MCHC RBC-ENTMCNC: 25.3 PG — LOW (ref 27–34)
MCHC RBC-ENTMCNC: 30.7 G/DL — LOW (ref 32–36)
MCV RBC AUTO: 82.4 FL — SIGNIFICANT CHANGE UP (ref 80–100)
MONOCYTES # BLD AUTO: 0.78 K/UL — SIGNIFICANT CHANGE UP (ref 0–0.9)
MONOCYTES NFR BLD AUTO: 5 % — SIGNIFICANT CHANGE UP (ref 2–14)
NEUTROPHILS # BLD AUTO: 13.88 K/UL — HIGH (ref 1.8–7.4)
NEUTROPHILS NFR BLD AUTO: 89 % — HIGH (ref 43–77)
NITRITE UR-MCNC: NEGATIVE — SIGNIFICANT CHANGE UP
PH UR: 7 — SIGNIFICANT CHANGE UP (ref 5–8)
PLATELET # BLD AUTO: 319 K/UL — SIGNIFICANT CHANGE UP (ref 150–400)
POTASSIUM SERPL-MCNC: 3.5 MMOL/L — SIGNIFICANT CHANGE UP (ref 3.5–5.3)
POTASSIUM SERPL-SCNC: 3.5 MMOL/L — SIGNIFICANT CHANGE UP (ref 3.5–5.3)
PROT SERPL-MCNC: 7.8 GM/DL — SIGNIFICANT CHANGE UP (ref 6–8.3)
PROT UR-MCNC: NEGATIVE MG/DL — SIGNIFICANT CHANGE UP
PROTHROM AB SERPL-ACNC: 11.8 SEC — SIGNIFICANT CHANGE UP (ref 9.9–13.4)
RBC # BLD: 4.38 M/UL — SIGNIFICANT CHANGE UP (ref 3.8–5.2)
RBC # FLD: 16.4 % — HIGH (ref 10.3–14.5)
RBC CASTS # UR COMP ASSIST: 1 /HPF — SIGNIFICANT CHANGE UP (ref 0–4)
RSV RNA NPH QL NAA+NON-PROBE: SIGNIFICANT CHANGE UP
SARS-COV-2 RNA SPEC QL NAA+PROBE: SIGNIFICANT CHANGE UP
SODIUM SERPL-SCNC: 140 MMOL/L — SIGNIFICANT CHANGE UP (ref 135–145)
SP GR SPEC: 1.01 — SIGNIFICANT CHANGE UP (ref 1–1.03)
SQUAMOUS # UR AUTO: 3 /HPF — SIGNIFICANT CHANGE UP (ref 0–5)
UROBILINOGEN FLD QL: 0.2 MG/DL — SIGNIFICANT CHANGE UP (ref 0.2–1)
WBC # BLD: 15.58 K/UL — HIGH (ref 3.8–10.5)
WBC # FLD AUTO: 15.58 K/UL — HIGH (ref 3.8–10.5)
WBC UR QL: 6 /HPF — HIGH (ref 0–5)

## 2024-12-24 PROCEDURE — 36000 PLACE NEEDLE IN VEIN: CPT

## 2024-12-24 PROCEDURE — 87086 URINE CULTURE/COLONY COUNT: CPT

## 2024-12-24 PROCEDURE — 85730 THROMBOPLASTIN TIME PARTIAL: CPT

## 2024-12-24 PROCEDURE — 85025 COMPLETE CBC W/AUTO DIFF WBC: CPT

## 2024-12-24 PROCEDURE — 71046 X-RAY EXAM CHEST 2 VIEWS: CPT

## 2024-12-24 PROCEDURE — 99285 EMERGENCY DEPT VISIT HI MDM: CPT

## 2024-12-24 PROCEDURE — 36415 COLL VENOUS BLD VENIPUNCTURE: CPT

## 2024-12-24 PROCEDURE — 81001 URINALYSIS AUTO W/SCOPE: CPT

## 2024-12-24 PROCEDURE — 99283 EMERGENCY DEPT VISIT LOW MDM: CPT | Mod: 25

## 2024-12-24 PROCEDURE — 85610 PROTHROMBIN TIME: CPT

## 2024-12-24 PROCEDURE — 0241U: CPT

## 2024-12-24 PROCEDURE — 86850 RBC ANTIBODY SCREEN: CPT

## 2024-12-24 PROCEDURE — 80053 COMPREHEN METABOLIC PANEL: CPT

## 2024-12-24 PROCEDURE — 86901 BLOOD TYPING SEROLOGIC RH(D): CPT

## 2024-12-24 PROCEDURE — 71046 X-RAY EXAM CHEST 2 VIEWS: CPT | Mod: 26

## 2024-12-24 PROCEDURE — 86900 BLOOD TYPING SEROLOGIC ABO: CPT

## 2024-12-24 RX ORDER — ACETAMINOPHEN 500MG 500 MG/1
1000 TABLET, COATED ORAL ONCE
Refills: 0 | Status: COMPLETED | OUTPATIENT
Start: 2024-12-24 | End: 2024-12-24

## 2024-12-24 RX ORDER — HYDROCORTISONE/PRAMOXINE 1 %-1 %
1 FOAM (GRAM) TOPICAL
Qty: 1 | Refills: 1
Start: 2024-12-24 | End: 2025-01-06

## 2024-12-24 RX ORDER — AZITHROMYCIN 250 MG/1
1 TABLET, FILM COATED ORAL
Qty: 1 | Refills: 0
Start: 2024-12-24 | End: 2024-12-28

## 2024-12-24 RX ORDER — SODIUM CHLORIDE 9 MG/ML
1000 INJECTION, SOLUTION INTRAMUSCULAR; INTRAVENOUS; SUBCUTANEOUS ONCE
Refills: 0 | Status: COMPLETED | OUTPATIENT
Start: 2024-12-24 | End: 2024-12-24

## 2024-12-24 RX ADMIN — SODIUM CHLORIDE 2000 MILLILITER(S): 9 INJECTION, SOLUTION INTRAMUSCULAR; INTRAVENOUS; SUBCUTANEOUS at 11:20

## 2024-12-24 RX ADMIN — ACETAMINOPHEN 500MG 1000 MILLIGRAM(S): 500 TABLET, COATED ORAL at 11:17

## 2024-12-24 NOTE — ED STATDOCS - NSICDXPASTMEDICALHX_GEN_ALL_CORE_FT
PAST MEDICAL HISTORY:  Anemia only during pregnancy    Emesis     Sciatica      (spontaneous vaginal delivery)      (spontaneous vaginal delivery)

## 2024-12-24 NOTE — ED STATDOCS - PATIENT PORTAL LINK FT
You can access the FollowMyHealth Patient Portal offered by [facility] by registering at the following website: [email or web address]. By joining [facility]’s FollowMyHealth portal, you will also be able to view your health information using other applications (apps) compatible with our system.

## 2024-12-24 NOTE — ED ADULT NURSE NOTE - OBJECTIVE STATEMENT
Pt presents to the ED c.o hemorrhoid bleeding. Pt reports that she is 2 months post partum vaginal delivery, and on Friday she got a hemorrhoid that started to bleed since Saturday. Pt reports having a temperature of 100.2 this morning, took Motrin.no breast feeding.

## 2024-12-24 NOTE — ED STATDOCS - OBJECTIVE STATEMENT
39 year-old female with past medical history of sciatica, , and anemia during pregnancy presents complaining of hemorrhoid bleeding. Pt reports she is 2 months post partum from a vaginal delivery, and on Friday she got a hemorrhoid that began bleeding since Saturday. Pt reports subjective fever of 100.2F this morning, took Motrin. No other complaints at this time. 39 year-old female with past medical history of sciatica, , and anemia during pregnancy presents complaining of hemorrhoid bleeding. Pt reports she is 2 months post partum from a vaginal delivery, and on Friday she got a hemorrhoid that began bleeding since Saturday. Pt reports subjective fever of 100.2F this morning, took Motrin. No other complaints at this time. Pt allergic to penicillin.

## 2024-12-24 NOTE — ED STATDOCS - CLINICAL SUMMARY MEDICAL DECISION MAKING FREE TEXT BOX
Pt presents with hemorrhoids and febrile illness, bleeding hemorrhoids. Will order labs and flu/covid swabs.

## 2024-12-24 NOTE — ED STATDOCS - CARE PLAN
Principal Discharge DX:	Acute febrile illness  Secondary Diagnosis:	External hemorrhoid, bleeding   1

## 2024-12-24 NOTE — ED STATDOCS - NSFOLLOWUPINSTRUCTIONS_ED_ALL_ED_FT
Hemorrhoids  The colon, with 3 close-ups of the rectum. One is normal and the other 2 show external and internal hemorrhoids.  Hemorrhoids are swollen veins in and around the rectum or the opening of the butt (anus). There are two types of hemorrhoids:  Internal. These occur in the veins just inside the rectum. They may poke through to the outside and become irritated and painful.  External. These occur in the veins outside the anus. They can be felt as a painful swelling or hard lump near the anus.  Most hemorrhoids do not cause severe problems. Often, they can be treated at home with diet and lifestyle changes. If home treatments do not help, you may need a procedure to shrink or remove the hemorrhoids.    What are the causes?  Hemorrhoids are caused by pressure near the anus. This pressure may be caused by:  Constipation or diarrhea.  Straining to poop.  Pregnancy.  Obesity.  Sitting or riding a bike for a long time.  Heavy lifting or other things that cause you to strain.  Anal sex.  What are the signs or symptoms?  Symptoms of this condition include:  Pain.  Anal itching or irritation.  Bleeding from the rectum.  Leakage of poop (stool).  Swelling of the anus.  One or more lumps around the anus.  How is this diagnosed?  Hemorrhoids can often be diagnosed through a visual exam. Other exams or tests may also be done, such as:  A digital rectal exam. This is when your health care provider feels inside your rectum with a gloved finger.  Anoscope. This is an exam of the anus using a small tube.  A blood test, if you have lost a lot of blood.  A sigmoidoscopy or colonoscopy. These are tests to look inside the colon using a tube with a camera on the end.  How is this treated?  In most cases, hemorrhoids can be treated at home with diet and lifestyle changes. If these changes do not help, you may need to have a procedure done. These procedures can make the hemorrhoids smaller or fully remove them. Common procedures include:  Rubber band ligation. Rubber bands are placed at the base of the hemorrhoids to cut off their blood supply.  Sclerotherapy. Medicine is put into the hemorrhoids to shrink them.  Infrared coagulation. A type of light energy is used to get rid of the hemorrhoids.  Hemorrhoidectomy surgery. The hemorrhoids are removed during surgery. Then, the veins that supply them are tied off.  Stapled hemorrhoidopexy surgery. The base of the hemorrhoid is stapled to the wall of the rectum.  Follow these instructions at home:  Medicines    Take over-the-counter and prescription medicines only as told by your provider.  Use medicated creams or medicines that are put in the rectum (suppositories) as told by your provider.  Eating and drinking    Examples of vegetables and other plant-based foods.  Eat foods that are high in fiber, such as beans, whole grains, and fresh fruits and vegetables.  Ask your provider about taking products that have fiber added to them (fiber supplements).  Reduce the amount of fat in your diet. You can do this by eating low-fat dairy products, eating less red meat, and avoiding processed foods.  Drink enough fluid to keep your pee (urine) pale yellow.  Managing pain and swelling    A bathtub partially filled with water.  Take warm sitz baths for 20 minutes, 3–4 times a day. This can help ease pain and discomfort. You may do this in a bathtub or you can use a portable sitz bath that fits over the toilet.  If told, put ice on the affected area. It may help to use ice packs between sitz baths.  Put ice in a plastic bag.  Place a towel between your skin and the bag.  Leave the ice on for 20 minutes, 2–3 times a day.  If your skin turns bright red, remove the ice right away to prevent skin damage. The risk of damage is higher if you cannot feel pain, heat, or cold.  General instructions    Exercise. Ask your provider how much and what kind of exercise is best for you. In general, you should do moderate exercise for at least 30 minutes on most days of the week (150 minutes each week). You may want to try walking, biking, or yoga.  Go to the bathroom when you have the urge to poop. Do not wait.  Avoid straining to poop.  Keep the anus dry and clean. Use wet toilet paper or moist towelettes after you poop.  Do not sit on the toilet for a long time. This can increase blood pooling and pain.  Where to find more information  [employer] [place] of Diabetes and Digestive and Kidney Diseases: niddk.nih.gov  Contact a health care provider if:  You have more pain and swelling that do not get better with treatment.  You have trouble pooping or you are not able to poop.  You have pain or inflammation outside the area of the hemorrhoids.  Get help right away if:  You are bleeding from your rectum and you cannot get it to stop.  This information is not intended to replace advice given to you by your health care provider. Make sure you discuss any questions you have with your health care provider.    Document Revised: 08/30/2023 Document Reviewed: 08/30/2023  [employer] Patient Education © 2024 [employer]  [employer] logo  Terms and Conditions  Privacy Policy  Editorial Policy  All content on this site: Copyright © 2024 Elsevier, its licensors, and contributors. All rights are reserved, including those for text and data mining, AI training, and similar technologies. For all open access content, the Creative Commons licensing terms apply.  Cookies are used by this site. To decline or learn more, visit our Cookies page.

## 2024-12-24 NOTE — ED STATDOCS - PHYSICAL EXAMINATION
Constitutional: NAD AAOx3  Eyes: EOMI, pupils equal  Head: Normocephalic atraumatic  Mouth: no airway obstruction, redness of oropharynx, mild cervical lymphadenopathy  Cardiac: regular rate   Resp: Lungs CTAB  GI: Abd s/nt/nd  Neuro: CN2-12 intact  Skin: No rashes  Rectal: + external hemorrhoid with scab, no active bleeding or redness

## 2024-12-24 NOTE — ED STATDOCS - PROGRESS NOTE DETAILS
MP hand
39-year-old female with past medical history of anal fissures requiring fissurectomy's x 3, sciatica  and anemia during pregnancy presents to the ED complaining of having an external hemorrhoid for 4 to 5 days associated with rectal bleeding.  Patient reports onset of hemorrhoid when straining during bowel movement.  Bleeding is intermittent.  Patient came to the ED today because she developed a fever this morning.  Patient denies abdominal pain nausea vomiting diarrhea.  Patient does have sore throat with coughing and phlegm production.  No shortness of breath will follow-up labs including CBC urine sample and swab.  Patient reports that she has a GI appointment on Thursday and has been using Tucks pads for relief from hemorrhoid irritation.  Adelaide Arita PA-C

## 2024-12-24 NOTE — ED ADULT TRIAGE NOTE - CHIEF COMPLAINT QUOTE
Pt presents to the ED c.o hemorrhoid bleeding. Pt reports that she is 2 months post partum vaginal delivery, and on Friday she got a hemorrhoid that started to bleed since Saturday. Pt reports having a temperature of 100.2 this morning, took Motrin.

## 2024-12-24 NOTE — ED STATDOCS - CCCP TRG CHIEF CMPLNT
/78 with repeat of 161/76  Not well controlled  Current regimen of: losartan 100 mg daily , metoprolol 100 mg qd  Adherent with meds  Not checking BP at home, does not have BP cuff  Denies HA, n/v  BP cuff sent to pharmacy, if barriers recommend purchasing cuff if can afford  Advised to keep log at hoome  If BP consistently above 160s/90 advised to call clinic  Otherwise, will review log in 1 week and discuss management optimization   Will check CMP today  Will obtain urine microalbumin at next visit      hemorrhoids

## 2024-12-25 LAB
CULTURE RESULTS: SIGNIFICANT CHANGE UP
SPECIMEN SOURCE: SIGNIFICANT CHANGE UP

## 2025-01-21 ENCOUNTER — APPOINTMENT (OUTPATIENT)
Dept: OBGYN | Facility: CLINIC | Age: 40
End: 2025-01-21
Payer: COMMERCIAL

## 2025-01-21 VITALS
DIASTOLIC BLOOD PRESSURE: 60 MMHG | HEIGHT: 62 IN | WEIGHT: 162 LBS | BODY MASS INDEX: 29.81 KG/M2 | SYSTOLIC BLOOD PRESSURE: 112 MMHG

## 2025-01-21 DIAGNOSIS — Z32.02 ENCOUNTER FOR PREGNANCY TEST, RESULT NEGATIVE: ICD-10-CM

## 2025-01-21 PROCEDURE — 81025 URINE PREGNANCY TEST: CPT

## 2025-01-21 PROCEDURE — 0502F SUBSEQUENT PRENATAL CARE: CPT

## 2025-01-22 LAB
HCG UR QL: NEGATIVE
QUALITY CONTROL: YES

## 2025-02-09 NOTE — ASU PREOP CHECKLIST - SIDE RAILS UP
Patient discharged in stable condition.   Discharge instructions reviewed with patient, including any relevant follow-up care and medication teaching.  All questions asked and answered at this time.  Urged to return to ED if any symptoms worsen or for life-threatening emergencies.   Patient verbalized understanding.  
n/a

## 2025-04-29 NOTE — PACU DISCHARGE NOTE - AIRWAY PATENCY:
Quality 130: Documentation Of Current Medications In The Medical Record: Current Medications Documented Detail Level: Detailed Quality 226: Preventive Care And Screening: Tobacco Use: Screening And Cessation Intervention: Patient screened for tobacco use and is an ex/non-smoker Satisfactory

## 2025-05-31 ENCOUNTER — NON-APPOINTMENT (OUTPATIENT)
Age: 40
End: 2025-05-31

## 2025-09-15 ENCOUNTER — APPOINTMENT (OUTPATIENT)
Dept: OBGYN | Facility: CLINIC | Age: 40
End: 2025-09-15

## 2025-09-15 ENCOUNTER — NON-APPOINTMENT (OUTPATIENT)
Age: 40
End: 2025-09-15

## 2025-09-15 VITALS
WEIGHT: 154 LBS | SYSTOLIC BLOOD PRESSURE: 96 MMHG | DIASTOLIC BLOOD PRESSURE: 64 MMHG | HEIGHT: 62 IN | BODY MASS INDEX: 28.34 KG/M2

## 2025-09-15 DIAGNOSIS — Z80.41 FAMILY HISTORY OF MALIGNANT NEOPLASM OF OVARY: ICD-10-CM

## 2025-09-15 DIAGNOSIS — Z13.79 ENCOUNTER FOR OTHER SCREENING FOR GENETIC AND CHROMOSOMAL ANOMALIES: ICD-10-CM

## 2025-09-15 DIAGNOSIS — Z01.419 ENCOUNTER FOR GYNECOLOGICAL EXAMINATION (GENERAL) (ROUTINE) W/OUT ABNORMAL FINDINGS: ICD-10-CM

## 2025-09-15 DIAGNOSIS — Z80.49 FAMILY HISTORY OF MALIGNANT NEOPLASM OF OTHER GENITAL ORGANS: ICD-10-CM

## 2025-09-18 LAB — HPV HIGH+LOW RISK DNA PNL CVX: NOT DETECTED

## 2025-09-21 LAB — CYTOLOGY CVX/VAG DOC THIN PREP: NORMAL
